# Patient Record
Sex: FEMALE | Race: WHITE | Employment: UNEMPLOYED | URBAN - METROPOLITAN AREA
[De-identification: names, ages, dates, MRNs, and addresses within clinical notes are randomized per-mention and may not be internally consistent; named-entity substitution may affect disease eponyms.]

---

## 2017-02-16 ENCOUNTER — DOCUMENTATION ONLY (OUTPATIENT)
Dept: FAMILY MEDICINE CLINIC | Age: 52
End: 2017-02-16

## 2017-02-16 NOTE — PROGRESS NOTES
Spoke with patient. She has moved and is getting her health care needs taken care of at a new location.

## 2019-12-16 NOTE — PROGRESS NOTES
Hematology/Oncology Consultation Note    Name: Ashleigh Gasca  Date: 2019  : 1965    Colby Evans MD         Subjective:     Chief complaint: Iron deficiency anemia    History of Present Illness:   Ms. Bong Fam is a most pleasant 48y.o. year old female who was seen for Iron deficiency anemia. The patient has history of iron deficiency anemia for years, GI bleed, colitis, peptic ulcer disease with H.pylori, B12 deficiency, asthma, sciatica, migraine, and fibromyalgia. She has a long history of anemia with multiple blood transfusion since childhood. She has history of recurrent H.pylori, peptic ulcer disease, and episodes of colitis flare with GI bleed and dark stools. GI evaluation 18 reported no evidence of Ulcerative colitis, ischemic or infectious colitis. She did not take oral iron as was told she could not absorb iron orally. She admitted she usually received iron IV together with blood transfusion. She was admitted on 19 for symptomatic anemia. CBC checked on 19 showed H/H 7.8/26. 6. Iron study showed Iron 38, TIBC 349, Iron saturation 11, and Ferritin 4. Reticulocyte Hemoglobin was low at 18.9. Vitamin B12 was low 183. She received 2 units of PRBCs and Iron IV during admission. Labs checked with her PCP on 19 reported improving Hb, 8.2. Today she reports feeling much better without any obvious complaints. She denied any further bleeding, melena, blood in stool, nausea, vomiting, diarrhea, or abdominal pain. She did receive monthly B12 injection in the past but stopped taking for about 2 years. She will schedule her b12 injection again with her PCP. She denied any fever, chills, fatigue, dizziness, headache, dyspnea, worsen cough, chest pain, new tingling/numbness or focal weakness. Her eating is well with a good appetite. She is currently working with [facility] [place] for a Gastroenterology follow-up. Presently she is taking prilosec twice daily.  She denied any other bleeding, also stopped periods for years, no abnormal menstrual or vaginal bleeding. No dark urine or hematuria or hemoptysis. FH: Denied family history of anemia or blood disorders      Oncologic History:   No history exists. Past Medical History, Family History, and Social History:    Past Medical History:   Diagnosis Date    Anemia     Asthma     Celiac disease     Depression     Fibromyalgia     Gastric ulcer     History of blood transfusion     History of stomach ulcers     Hypertension     Joint pain     Musculoskeletal disorder     back problems     Past Surgical History:   Procedure Laterality Date    HX ACL RECONSTRUCTION      HX APPENDECTOMY      HX  SECTION  x4    HX CHOLECYSTECTOMY      HX GI  2012    Parts of small and Large intestine taken due to ulcers.     HX OOPHORECTOMY      HX ORTHOPAEDIC      Lt knee    HX TYMPANOSTOMY       Social History     Socioeconomic History    Marital status:      Spouse name: Not on file    Number of children: Not on file    Years of education: Not on file    Highest education level: Not on file   Occupational History    Not on file   Social Needs    Financial resource strain: Not on file    Food insecurity:     Worry: Not on file     Inability: Not on file    Transportation needs:     Medical: Not on file     Non-medical: Not on file   Tobacco Use    Smoking status: Former Smoker     Last attempt to quit: 5/3/2012     Years since quittin.6    Smokeless tobacco: Never Used   Substance and Sexual Activity    Alcohol use: No    Drug use: No    Sexual activity: Yes     Partners: Male   Lifestyle    Physical activity:     Days per week: Not on file     Minutes per session: Not on file    Stress: Not on file   Relationships    Social connections:     Talks on phone: Not on file     Gets together: Not on file     Attends Yazdanism service: Not on file     Active member of club or organization: Not on file     Attends meetings of clubs or organizations: Not on file     Relationship status: Not on file    Intimate partner violence:     Fear of current or ex partner: Not on file     Emotionally abused: Not on file     Physically abused: Not on file     Forced sexual activity: Not on file   Other Topics Concern    Not on file   Social History Narrative    Not on file     Family History   Problem Relation Age of Onset    Diabetes Mother     Hypertension Mother     Diabetes Father     Hypertension Father     Hypertension Sister     Diabetes Sister      Current Outpatient Medications   Medication Sig Dispense Refill    OTHER Take 0.5 mL by mouth.  omeprazole (PRILOSEC) 20 mg capsule Take 20 mg by mouth.  HYDROcodone-acetaminophen (NORCO) 5-325 mg per tablet Take 1 Tab by mouth every four (4) hours as needed for Pain. Max Daily Amount: 6 Tabs. 12 Tab 0    mirtazapine (REMERON SOL-TAB) 15 mg disintegrating tablet Take 1 Tab by mouth nightly. 30 Tab 5    ferrous sulfate 75mg/ml, 15 mg/ml elemental iron, (EVGENY-IN-SOL) 15 mg iron (75 mg)/mL drop drops Take 15 mg by mouth BID Mon Wed & Fri.          Review of Systems:  Constitutional:   Fever: Negative, Chills: Negative, Weight Loss: Negative, Malaise/Fatigue: Negative   Diaphoresis: Negative  Skin:   Rash: Negative,  Itching: Negative  HENT:   Headache:Negative, Hearing Loss: Negative, Tinnitus: Negative, Ear Pain: Negative   Nosebleeds: Negative, Stridor: Negative, Sore Throat: Negative  Eyes:   Jaudice: negative Blurred Vision: Negative, Double Vision: Negative, Photophobia: Negative   Eye Discharge: Negative, Eye Redness: Negative  Cardiovascular:    Chest Pain: Negative, Palpitations: Negative, Orthopnea: Negative   Leg Swelling: Negative PND: Negative  Respiratory:   Cough: Negative, Hemoptysis: Negative, Sputum Production: Negative   Shortness of Breath: Negative, Wheezing: Negative  Gastrointestinal:   Heartburn: Negative, Nausea: Negative, Vomiting: Negative Abdominal Pain: Negative, Diarrhea: Negative, Constipation: Negative   Blood in Stool: Negative, Melena: Negative   Genitourinary:    Dysuria: Negative, Urgency: Negative, Frequency: Negative   Hematuria: Negative, Flank Pain: Negative  Musculoskeletal:    Myalgias: Negative, Neck Pain: Negative, Back Pain: Negative   Joint Pain: Negative, Falls: Negative  Endo/Heme:    Easy Bruise/Blood: Negative, Polydipsia: Negative, sweats: negative  Neurological:    Dizziness: Negative, Tingling: Negative. Tremor: Negative,    Sensory Change: Negative. Speech Change: Negative, Focal Weakness: Negative     Seizures: Negative, LOC: Negative  Psychiatric:   Depression: Negative, Suicidal Ideas: Negative,  Hallucinations: Negative, Anxious: Negative, Insomnia: Negative, Memory Loss: Negative       Objective:     Visit Vitals  /81 (BP 1 Location: Right arm, BP Patient Position: Sitting)   Pulse 61   Temp 98.1 °F (36.7 °C) (Oral)   Resp 18   Ht 5' 3\" (1.6 m)   Wt 66 kg (145 lb 9.6 oz)   LMP 12/26/2012   SpO2 100%   BMI 25.79 kg/m²       ECOG Performance Status (grade): 0  0 - able to carry on all pre-disease activity w/out restriction  1 - restricted but able to carry out light work  2 - ambulatory and can self- care but unable to carry out work  3 - bed or chair >50% of waking hours  4 - completely disable, total care, confined to bed or chair    Physical Exam:   Gen. Appearance: The patient is in no acute distress. Skin: There is no bruise or rash. HEENT: The exam is unremarkable. Neck: Supple without lymphadenopathy or thyromegaly. Lungs: Clear to auscultation and percussion; there are no wheezes or rhonchi. Heart: Regular rate and rhythm; there are no murmurs, gallops, or rubs. Abdomen: Bowel sounds are present and normal.  There is no guarding, tenderness, or hepatosplenomegaly. Extremities: There is no clubbing, cyanosis, or edema. Neurologic: There are no focal neurologic deficits.     Lymphatics: There is no palpable peripheral lymphadenopathy. Musculoskeletal: The patient has full range of motion at all joints. There is no evidence of joint deformity or effusions. There is no focal joint tenderness. Psychological/psychiatric: There is no clinical evidence of anxiety, depression, or melancholy. Diagnostics:      No results found for this or any previous visit. No results found for this or any previous visit (from the past 2016 hour(s)). Assessment and Plan:                                        1. Iron deficiency anemia due to chronic blood loss    2. Anemia due to vitamin B12 deficiency, unspecified B12 deficiency type    Today I discussed with her about the diagnosis of anemia. She has long history of iron deficiency anemia and Vitamin B12 deficiency. She did receive multiple blood transfusion, IV Iron, and B12 injection in the past. Her iron deficiency anemia could be related to gastrointestinal losses with colitis and peptic ulcer. Helicobacter pylori infection, even in the absence of active gastrointestinal bleeding, can cause iron deficiency and is known to reduce both iron and ferritin levels. She was admitted on 11/25/19 for symptomatic anemia. CBC checked on 11/26/19 showed H/H 7.8/26. 6. Iron study showed Iron 38, TIBC 349, Iron saturation 11, and Ferritin 4. Reticulocyte Hemoglobin was low at 18.9. Vitamin B12 was also low 183. She received 2 units of PRBCs and Iron IV during the admission. Labs followup with her PCP on 12/4/19 reported improving Hb, 8.2. Clinically improving with no new bleeding episodes. Plan:  - We will repeat CBC and Iron study with Ferritin 6-8 weeks after Iron infused to ensure an appropriate response (about 3 weeks from now). - We will also order other work-up for chronic anemia including CRP/ESR, SPEP, LDH, Haptoglobin, ans TSH.  - We have recommended Gastroenterological workup for chronic blood loss from colitis and peptic ulcer with H.pylori.  She is presently pending GI schedule at [facility].  - She will arrange her B12 injection with her PCP office.  - I will see the patient back in clinic in about 4 weeks. Always sooner if required. Orders Placed This Encounter    LD     Standing Status:   Future     Standing Expiration Date:   12/16/2020    C REACTIVE PROTEIN, QT     Standing Status:   Future     Standing Expiration Date:   12/16/2020    TSH AND FREE T4     Standing Status:   Future     Standing Expiration Date:   12/16/2020    SED RATE (ESR)     Standing Status:   Future     Standing Expiration Date:   12/16/2020    PROTEIN ELECTROPHORESIS     Standing Status:   Future     Standing Expiration Date:   12/16/2020    VITAMIN B12 & FOLATE     Standing Status:   Future     Standing Expiration Date:   12/16/2020    FERRITIN     Standing Status:   Future     Standing Expiration Date:   12/16/2020    IRON PROFILE     Standing Status:   Future     Standing Expiration Date:   34/28/2921    METABOLIC PANEL, COMPREHENSIVE     Standing Status:   Future     Standing Expiration Date:   12/16/2020    RETICULOCYTE COUNT     Standing Status:   Future     Standing Expiration Date:   12/16/2020    CBC WITH AUTOMATED DIFF     Standing Status:   Future     Standing Expiration Date:   12/16/2020    HAPTOGLOBIN     Standing Status:   Future     Standing Expiration Date:   12/17/2020    OTHER     Sig: Take 0.5 mL by mouth.  omeprazole (PRILOSEC) 20 mg capsule     Sig: Take 20 mg by mouth. All of patient's questions answered to their apparent satisfaction. They verbally show understanding and agreement with the plan. Ms. Laura Guajardo has a reminder for a \"due or due soon\" health maintenance. I have asked that she contact her primary care provider for follow-up on this health maintenance.      About 45 minutes were spent for this encounter with more than 50% of the time spent in face-to-face counseling and discussing on diagnosis and management plan going forward. I would like to  thank Dr Geovanna San MD  for allowing me to participate in the care of this very pleasant patient. If I can be of further assistance please do not hesitate to call. Hiram Hawk MD  12/17/2019          Parts of this document has been produced using Dragon dictation system. Unrecognized errors in transcription may be present. Please do not hesitate to reach out for any questions or clarifications.

## 2019-12-17 ENCOUNTER — OFFICE VISIT (OUTPATIENT)
Dept: ONCOLOGY | Age: 54
End: 2019-12-17

## 2019-12-17 VITALS
TEMPERATURE: 98.1 F | HEIGHT: 63 IN | WEIGHT: 145.6 LBS | HEART RATE: 61 BPM | OXYGEN SATURATION: 100 % | BODY MASS INDEX: 25.8 KG/M2 | SYSTOLIC BLOOD PRESSURE: 119 MMHG | RESPIRATION RATE: 18 BRPM | DIASTOLIC BLOOD PRESSURE: 81 MMHG

## 2019-12-17 DIAGNOSIS — D51.9 ANEMIA DUE TO VITAMIN B12 DEFICIENCY, UNSPECIFIED B12 DEFICIENCY TYPE: ICD-10-CM

## 2019-12-17 DIAGNOSIS — D50.0 IRON DEFICIENCY ANEMIA DUE TO CHRONIC BLOOD LOSS: Primary | ICD-10-CM

## 2019-12-17 PROBLEM — D50.9 IRON DEFICIENCY ANEMIA: Status: ACTIVE | Noted: 2019-12-17

## 2019-12-17 PROBLEM — D51.3 OTHER DIETARY VITAMIN B12 DEFICIENCY ANEMIA: Status: ACTIVE | Noted: 2019-12-17

## 2019-12-17 RX ORDER — OMEPRAZOLE 20 MG/1
20 CAPSULE, DELAYED RELEASE ORAL
COMMUNITY

## 2019-12-17 NOTE — LETTER
12/17/19 Patient: Naun Pappas YOB: 1965 Date of Visit: 12/17/2019 Chauncy Libman, MD 
[address] [place] [id number] VIA Facsimile: [phone] Dear Chauncy Libman, MD, Thank you for referring Ms. Naun Pappas to [facility] for evaluation. My notes for this consultation are attached. If you have questions, please do not hesitate to call me. I look forward to following your patient along with you. Sincerely, Emily Stevens MD

## 2019-12-17 NOTE — PATIENT INSTRUCTIONS
Iron Deficiency Anemia: Care Instructions Your Care Instructions Anemia means that you do not have enough red blood cells. Red blood cells carry oxygen around your body. When you have anemia, it can make you pale, weak, and tired. Many things can cause anemia. The most common cause is loss of blood. This can happen if you have heavy menstrual periods. It can also happen if you have bleeding in your stomach or bowel. You can also get anemia if you don't have enough iron in your diet or if it's hard for your body to absorb iron. In some cases, pregnancy causes anemia. That's because a pregnant woman needs more iron. Your doctor may do more tests to find the cause of your anemia. If a disease or other health problem is causing it, your doctor will treat that problem. It's important to follow up with your doctor to make sure that your iron level returns to normal. 
Follow-up care is a key part of your treatment and safety. Be sure to make and go to all appointments, and call your doctor if you are having problems. It's also a good idea to know your test results and keep a list of the medicines you take. How can you care for yourself at home? · If your doctor recommended iron pills, take them as directed. ? Try to take the pills on an empty stomach. You can do this about 1 hour before or 2 hours after meals. But you may need to take iron with food to avoid an upset stomach. ? Do not take antacids or drink milk or anything with caffeine within 2 hours of when you take your iron. They can keep your body from absorbing the iron well. ? Vitamin C helps your body absorb iron. You may want to take iron pills with a glass of orange juice or some other food high in vitamin C. 
? Iron pills may cause stomach problems. These include heartburn, nausea, diarrhea, constipation, and cramps. It can help to drink plenty of fluids and include fruits, vegetables, and fiber in your diet. ? It's normal for iron pills to make your stool a greenish or grayish black. But internal bleeding can also cause dark stool. So it's important to tell your doctor about any color changes. ? Call your doctor if you think you are having a problem with your iron pills. Even after you start to feel better, it will take several months for your body to build up its supply of iron. ? If you miss a pill, don't take a double dose. ? Keep iron pills out of the reach of small children. Too much iron can be very dangerous. · Eat foods with a lot of iron. These include red meat, shellfish, poultry, and eggs. They also include beans, raisins, whole-grain bread, and leafy green vegetables. · Steam your vegetables. This is the best way to prepare them if you want to get as much iron as possible. · Be safe with medicines. Do not take nonsteroidal anti-inflammatory pain relievers unless your doctor tells you to. These include aspirin, naproxen (Aleve), and ibuprofen (Advil, Motrin). · Liquid iron can stain your teeth. But you can mix it with water or juice and drink it with a straw. Then it won't get on your teeth. When should you call for help? Call 911 anytime you think you may need emergency care. For example, call if: 
  · You passed out (lost consciousness).  
 Call your doctor now or seek immediate medical care if: 
  · You are short of breath.  
  · You are dizzy or light-headed, or you feel like you may faint.  
  · You have new or worse bleeding.  
 Watch closely for changes in your health, and be sure to contact your doctor if: 
  · You feel weaker or more tired than usual.  
  · You do not get better as expected. Where can you learn more? Go to http://bianca-deven.info/. Enter Q005 in the search box to learn more about \"Iron Deficiency Anemia: Care Instructions. \" Current as of: March 28, 2019 Content Version: 12.2 © 2874-8014 [employer].  Care instructions adapted under license by [address] (which disclaims liability or warranty for this information). If you have questions about a medical condition or this instruction, always ask your healthcare professional. [address] any warranty or liability for your use of this information.

## 2020-01-07 ENCOUNTER — HOSPITAL ENCOUNTER (OUTPATIENT)
Dept: INFUSION THERAPY | Age: 55
Discharge: HOME | End: 2020-01-07
Payer: SUBSIDIZED

## 2020-01-07 LAB
ALBUMIN SERPL-MCNC: 3.8 G/DL (ref 3.4–5)
ALBUMIN/GLOB SERPL: 1.4 {RATIO} (ref 0.8–1.7)
ALP SERPL-CCNC: 97 U/L (ref 45–117)
ALT SERPL-CCNC: 23 U/L (ref 13–56)
ANION GAP SERPL CALC-SCNC: 5 MMOL/L (ref 3–18)
AST SERPL-CCNC: 18 U/L (ref 10–38)
BASOPHILS # BLD: 0 K/UL (ref 0–0.1)
BASOPHILS NFR BLD: 1 % (ref 0–2)
BILIRUB SERPL-MCNC: 0.2 MG/DL (ref 0.2–1)
BUN SERPL-MCNC: 17 MG/DL (ref 7–18)
BUN/CREAT SERPL: 21 (ref 12–20)
CALCIUM SERPL-MCNC: 9.2 MG/DL (ref 8.5–10.1)
CHLORIDE SERPL-SCNC: 107 MMOL/L (ref 100–111)
CO2 SERPL-SCNC: 28 MMOL/L (ref 21–32)
CREAT SERPL-MCNC: 0.8 MG/DL (ref 0.6–1.3)
CRP SERPL-MCNC: <0.3 MG/DL (ref 0–0.3)
DIFFERENTIAL METHOD BLD: ABNORMAL
EOSINOPHIL # BLD: 0.1 K/UL (ref 0–0.4)
EOSINOPHIL NFR BLD: 2 % (ref 0–5)
ERYTHROCYTE [DISTWIDTH] IN BLOOD BY AUTOMATED COUNT: 20.8 % (ref 11.6–14.5)
ERYTHROCYTE [SEDIMENTATION RATE] IN BLOOD: 13 MM/HR (ref 0–30)
FERRITIN SERPL-MCNC: 5 NG/ML (ref 8–388)
FOLATE SERPL-MCNC: 9.4 NG/ML (ref 3.1–17.5)
GLOBULIN SER CALC-MCNC: 2.7 G/DL (ref 2–4)
GLUCOSE SERPL-MCNC: 83 MG/DL (ref 74–99)
HAPTOGLOB SERPL-MCNC: 139 MG/DL (ref 30–200)
HCT VFR BLD AUTO: 33.7 % (ref 35–45)
HGB BLD-MCNC: 10.2 G/DL (ref 12–16)
IRON SATN MFR SERPL: 9 %
IRON SERPL-MCNC: 36 UG/DL (ref 50–175)
LDH SERPL L TO P-CCNC: 156 U/L (ref 81–234)
LYMPHOCYTES # BLD: 0.8 K/UL (ref 0.9–3.6)
LYMPHOCYTES NFR BLD: 28 % (ref 21–52)
MCH RBC QN AUTO: 26.2 PG (ref 24–34)
MCHC RBC AUTO-ENTMCNC: 30.3 G/DL (ref 31–37)
MCV RBC AUTO: 86.6 FL (ref 74–97)
MONOCYTES # BLD: 0.3 K/UL (ref 0.05–1.2)
MONOCYTES NFR BLD: 10 % (ref 3–10)
NEUTS SEG # BLD: 1.7 K/UL (ref 1.8–8)
NEUTS SEG NFR BLD: 59 % (ref 40–73)
PLATELET # BLD AUTO: 251 K/UL (ref 135–420)
PMV BLD AUTO: 10.6 FL (ref 9.2–11.8)
POTASSIUM SERPL-SCNC: 4.7 MMOL/L (ref 3.5–5.5)
PROT SERPL-MCNC: 6.5 G/DL (ref 6.4–8.2)
RBC # BLD AUTO: 3.89 M/UL (ref 4.2–5.3)
RETICS/RBC NFR AUTO: 0.9 % (ref 0.5–2.3)
SODIUM SERPL-SCNC: 140 MMOL/L (ref 136–145)
T4 FREE SERPL-MCNC: 0.8 NG/DL (ref 0.7–1.5)
TIBC SERPL-MCNC: 409 UG/DL (ref 250–450)
TSH SERPL DL<=0.05 MIU/L-ACNC: 1.17 UIU/ML (ref 0.36–3.74)
VIT B12 SERPL-MCNC: 269 PG/ML (ref 211–911)
WBC # BLD AUTO: 2.8 K/UL (ref 4.6–13.2)

## 2020-01-07 PROCEDURE — 36415 COLL VENOUS BLD VENIPUNCTURE: CPT

## 2020-01-07 PROCEDURE — 83010 ASSAY OF HAPTOGLOBIN QUANT: CPT

## 2020-01-07 PROCEDURE — 85652 RBC SED RATE AUTOMATED: CPT

## 2020-01-07 PROCEDURE — 86140 C-REACTIVE PROTEIN: CPT

## 2020-01-07 PROCEDURE — 82607 VITAMIN B-12: CPT

## 2020-01-07 PROCEDURE — 83615 LACTATE (LD) (LDH) ENZYME: CPT

## 2020-01-07 PROCEDURE — 84165 PROTEIN E-PHORESIS SERUM: CPT

## 2020-01-07 PROCEDURE — 82728 ASSAY OF FERRITIN: CPT

## 2020-01-07 PROCEDURE — 85025 COMPLETE CBC W/AUTO DIFF WBC: CPT

## 2020-01-07 PROCEDURE — 83540 ASSAY OF IRON: CPT

## 2020-01-07 PROCEDURE — 80053 COMPREHEN METABOLIC PANEL: CPT

## 2020-01-07 PROCEDURE — 84439 ASSAY OF FREE THYROXINE: CPT

## 2020-01-07 PROCEDURE — 85045 AUTOMATED RETICULOCYTE COUNT: CPT

## 2020-01-08 LAB
ALBUMIN SERPL ELPH-MCNC: 3.8 G/DL (ref 2.9–4.4)
ALBUMIN/GLOB SERPL: 1.7 {RATIO} (ref 0.7–1.7)
ALPHA1 GLOB SERPL ELPH-MCNC: 0.2 G/DL (ref 0–0.4)
ALPHA2 GLOB SERPL ELPH-MCNC: 0.6 G/DL (ref 0.4–1)
B-GLOBULIN SERPL ELPH-MCNC: 1 G/DL (ref 0.7–1.3)
GAMMA GLOB SERPL ELPH-MCNC: 0.5 G/DL (ref 0.4–1.8)
GLOBULIN SER CALC-MCNC: 2.3 G/DL (ref 2.2–3.9)
M PROTEIN SERPL ELPH-MCNC: NORMAL G/DL
PROT SERPL-MCNC: 6.1 G/DL (ref 6–8.5)

## 2020-01-13 NOTE — PROGRESS NOTES
Hematology/Oncology Note    Name: Caryl Barboza  Date: 2020  : 1965    Annie Palomino MD         Subjective:     Chief complaint: Follow up anemia    History of Present Illness: The patient presented here today for follow up her anemia. She reported having celiac flares for the past few days with diarrhea, also noted some blood in stools. She has had LLQ mild crampy pain for few days, no obvious radiation or alleviating factors. Denied nausea, vomiting, significant rectal bleeding, bruising, other bleeding issues. She denied any fever, chills, fatigue, dizziness, headache, dyspnea, worsen cough, chest pain, new tingling/numbness or focal weakness. Her eating is fairly well with gluten free diets. She already scheduled to see GI next month, 2020. Also she will start B12 injection next week at PCP office. She was not feeling well with iron pills in the past. Presently she is taking prilosec twice daily. History:  Ms. Loren Lopez is a most pleasant 47y.o. year old female who was seen for Iron deficiency anemia. The patient has history of iron deficiency anemia for years, GI bleed, Celiac disease, colitis, peptic ulcer disease with H.pylori, B12 deficiency, asthma, sciatica, migraine, and fibromyalgia. She has a long history of anemia with multiple blood transfusion since childhood. She has history of recurrent H.pylori, peptic ulcer disease, and episodes of colitis flare with GI bleed and dark stools. GI evaluation 18 reported no evidence of Ulcerative colitis, ischemic or infectious colitis. She has been doing well with following a gluten free diet. She did not take oral iron as was told she could not absorb iron orally. She admitted she usually received iron IV together with blood transfusion. She was admitted on 19 for symptomatic anemia. CBC checked on 19 showed H/H 7.8/26. 6. Iron study showed Iron 38, TIBC 349, Iron saturation 11, and Ferritin 4.  Reticulocyte Hemoglobin was low at 18.9. Vitamin B12 was low 183. She received 2 units of PRBCs and Iron IV during admission. Labs checked with her PCP on 19 reported improving Hb, 8.2. FH: Denied family history of anemia or blood disorders      Oncologic History:   No history exists. Past Medical History, Family History, and Social History:    Past Medical History:   Diagnosis Date    Anemia     Asthma     Celiac disease     Depression     Fibromyalgia     Gastric ulcer     History of blood transfusion     History of stomach ulcers     Hypertension     Joint pain     Musculoskeletal disorder     back problems     Past Surgical History:   Procedure Laterality Date    HX ACL RECONSTRUCTION      HX APPENDECTOMY      HX  SECTION  x4    HX CHOLECYSTECTOMY      HX GI  2012    Parts of small and Large intestine taken due to ulcers.     HX OOPHORECTOMY      HX ORTHOPAEDIC      Lt knee    HX TYMPANOSTOMY       Social History     Socioeconomic History    Marital status:      Spouse name: Not on file    Number of children: Not on file    Years of education: Not on file    Highest education level: Not on file   Occupational History    Not on file   Social Needs    Financial resource strain: Not on file    Food insecurity:     Worry: Not on file     Inability: Not on file    Transportation needs:     Medical: Not on file     Non-medical: Not on file   Tobacco Use    Smoking status: Former Smoker     Last attempt to quit: 5/3/2012     Years since quittin.7    Smokeless tobacco: Never Used   Substance and Sexual Activity    Alcohol use: No    Drug use: No    Sexual activity: Yes     Partners: Male   Lifestyle    Physical activity:     Days per week: Not on file     Minutes per session: Not on file    Stress: Not on file   Relationships    Social connections:     Talks on phone: Not on file     Gets together: Not on file     Attends [employer] service: Not on file     Active member of club or organization: Not on file     Attends meetings of clubs or organizations: Not on file     Relationship status: Not on file    Intimate partner violence:     Fear of current or ex partner: Not on file     Emotionally abused: Not on file     Physically abused: Not on file     Forced sexual activity: Not on file   Other Topics Concern    Not on file   Social History Narrative    Not on file     Family History   Problem Relation Age of Onset    Diabetes Mother     Hypertension Mother     Diabetes Father     Hypertension Father     Hypertension Sister     Diabetes Sister      Current Outpatient Medications   Medication Sig Dispense Refill    omeprazole (PRILOSEC) 20 mg capsule Take 20 mg by mouth.  HYDROcodone-acetaminophen (NORCO) 5-325 mg per tablet Take 1 Tab by mouth every four (4) hours as needed for Pain. Max Daily Amount: 6 Tabs. 12 Tab 0    mirtazapine (REMERON SOL-TAB) 15 mg disintegrating tablet Take 1 Tab by mouth nightly. 30 Tab 5    ferrous sulfate 75mg/ml, 15 mg/ml elemental iron, (EVGENY-IN-SOL) 15 mg iron (75 mg)/mL drop drops Take 15 mg by mouth BID Mon Wed & Fri.      OTHER Take 0.5 mL by mouth.          Review of Systems:  Constitutional:   Fever: Negative, Chills: Negative, Weight Loss: Negative, Chronic Fatigue: Positive   Diaphoresis: Negative  Skin:   Rash: Negative,  Itching: Negative  HENT:   Headache:Negative, Hearing Loss: Negative, Tinnitus: Negative, Ear Pain: Negative   Nosebleeds: Negative, Stridor: Negative, Sore Throat: Negative  Eyes:   Jaudice: negative Blurred Vision: Negative, Double Vision: Negative, Photophobia: Negative   Eye Discharge: Negative, Eye Redness: Negative  Cardiovascular:    Chest Pain: Negative, Palpitations: Negative, Orthopnea: Negative   Leg Swelling: Negative PND: Negative  Respiratory:   Cough: Negative, Hemoptysis: Negative, Sputum Production: Negative   Shortness of Breath: Negative, Wheezing: Negative  Gastrointestinal: Heartburn: Negative, Nausea: Negative, Vomiting: Negative   Abdominal Pain: Negative, Diarrhea: positive, Constipation: Negative   Blood in Stool: Positive, Melena: Negative   Genitourinary:    Dysuria: Negative, Urgency: Negative, Frequency: Negative   Hematuria: Negative, Flank Pain: Negative  Musculoskeletal:    Myalgias: Negative, Neck Pain: Negative, Back Pain: Negative   Joint Pain: Negative, Falls: Negative  Endo/Heme:    Easy Bruise/Blood: Negative, Polydipsia: Negative, sweats: negative  Neurological:    Dizziness: Negative, Tingling: Negative. Tremor: Negative,    Sensory Change: Negative. Speech Change: Negative, Focal Weakness: Negative     Seizures: Negative, LOC: Negative  Psychiatric:   Depression: Negative, Suicidal Ideas: Negative,  Hallucinations: Negative, Anxious: Negative, Insomnia: Negative, Memory Loss: Negative       Objective:     Visit Vitals  /87 (BP 1 Location: Right arm, BP Patient Position: Sitting)   Pulse 66   Temp 98.4 °F (36.9 °C) (Oral)   Resp 18   Ht 5' 3\" (1.6 m)   Wt 67.1 kg (148 lb)   LMP 12/26/2012   SpO2 100%   BMI 26.22 kg/m²       ECOG Performance Status (grade): 0  0 - able to carry on all pre-disease activity w/out restriction  1 - restricted but able to carry out light work  2 - ambulatory and can self- care but unable to carry out work  3 - bed or chair >50% of waking hours  4 - completely disable, total care, confined to bed or chair    Physical Exam:   Gen. Appearance: The patient is in no acute distress. Skin: There is no bruise or rash. HEENT: The exam is unremarkable. Neck: Supple without lymphadenopathy or thyromegaly. Lungs: Clear to auscultation and percussion; there are no wheezes or rhonchi. Heart: Regular rate and rhythm; there are no murmurs, gallops, or rubs. Abdomen: Bowel sounds are present and normal.  There is no guarding, tenderness, or hepatosplenomegaly. Extremities: There is no clubbing, cyanosis, or edema.     Neurologic: There are no focal neurologic deficits. Lymphatics: There is no palpable peripheral lymphadenopathy. Musculoskeletal: The patient has full range of motion at all joints. There is no evidence of joint deformity or effusions. There is no focal joint tenderness. Psychological/psychiatric: There is no clinical evidence of anxiety, depression, or melancholy. Diagnostics:      No results found for this or any previous visit. Recent Results (from the past 2016 hour(s))   VITAMIN B12 & FOLATE    Collection Time: 01/07/20 10:25 AM   Result Value Ref Range    Vitamin B12 269 211 - 911 pg/mL    Folate 9.4 3.10 - 17.50 ng/mL   CBC WITH AUTOMATED DIFF    Collection Time: 01/07/20 10:25 AM   Result Value Ref Range    WBC 2.8 (L) 4.6 - 13.2 K/uL    RBC 3.89 (L) 4.20 - 5.30 M/uL    HGB 10.2 (L) 12.0 - 16.0 g/dL    HCT 33.7 (L) 35.0 - 45.0 %    MCV 86.6 74.0 - 97.0 FL    MCH 26.2 24.0 - 34.0 PG    MCHC 30.3 (L) 31.0 - 37.0 g/dL    RDW 20.8 (H) 11.6 - 14.5 %    PLATELET 592 093 - 835 K/uL    MPV 10.6 9.2 - 11.8 FL    NEUTROPHILS 59 40 - 73 %    LYMPHOCYTES 28 21 - 52 %    MONOCYTES 10 3 - 10 %    EOSINOPHILS 2 0 - 5 %    BASOPHILS 1 0 - 2 %    ABS. NEUTROPHILS 1.7 (L) 1.8 - 8.0 K/UL    ABS. LYMPHOCYTES 0.8 (L) 0.9 - 3.6 K/UL    ABS. MONOCYTES 0.3 0.05 - 1.2 K/UL    ABS. EOSINOPHILS 0.1 0.0 - 0.4 K/UL    ABS.  BASOPHILS 0.0 0.0 - 0.1 K/UL    DF AUTOMATED     C REACTIVE PROTEIN, QT    Collection Time: 01/07/20 10:25 AM   Result Value Ref Range    C-Reactive protein <0.3 0 - 0.3 mg/dL   SED RATE (ESR)    Collection Time: 01/07/20 10:25 AM   Result Value Ref Range    Sed rate, automated 13 0 - 30 mm/hr   FERRITIN    Collection Time: 01/07/20 10:25 AM   Result Value Ref Range    Ferritin 5 (L) 8 - 388 NG/ML   HAPTOGLOBIN    Collection Time: 01/07/20 10:25 AM   Result Value Ref Range    Haptoglobin 139 30 - 200 mg/dL   IRON PROFILE    Collection Time: 01/07/20 10:25 AM   Result Value Ref Range    Iron 36 (L) 50 - 175 ug/dL TIBC 409 250 - 450 ug/dL    Iron % saturation 9 %   LD    Collection Time: 01/07/20 10:25 AM   Result Value Ref Range     81 - 625 U/L   METABOLIC PANEL, COMPREHENSIVE    Collection Time: 01/07/20 10:25 AM   Result Value Ref Range    Sodium 140 136 - 145 mmol/L    Potassium 4.7 3.5 - 5.5 mmol/L    Chloride 107 100 - 111 mmol/L    CO2 28 21 - 32 mmol/L    Anion gap 5 3.0 - 18 mmol/L    Glucose 83 74 - 99 mg/dL    BUN 17 7.0 - 18 MG/DL    Creatinine 0.80 0.6 - 1.3 MG/DL    BUN/Creatinine ratio 21 (H) 12 - 20      GFR est AA >60 >60 ml/min/1.73m2    GFR est non-AA >60 >60 ml/min/1.73m2    Calcium 9.2 8.5 - 10.1 MG/DL    Bilirubin, total 0.2 0.2 - 1.0 MG/DL    ALT (SGPT) 23 13 - 56 U/L    AST (SGOT) 18 10 - 38 U/L    Alk. phosphatase 97 45 - 117 U/L    Protein, total 6.5 6.4 - 8.2 g/dL    Albumin 3.8 3.4 - 5.0 g/dL    Globulin 2.7 2.0 - 4.0 g/dL    A-G Ratio 1.4 0.8 - 1.7     RETICULOCYTE COUNT    Collection Time: 01/07/20 10:25 AM   Result Value Ref Range    Reticulocyte count 0.9 0.5 - 2.3 %   PROTEIN ELECTROPHORESIS    Collection Time: 01/07/20 10:25 AM   Result Value Ref Range    Protein, total 6.1 6.0 - 8.5 g/dL    Albumin 3.8 2.9 - 4.4 g/dL    Alpha-1-globulin 0.2 0.0 - 0.4 g/dL    ALPHA-2 GLOBULIN 0.6 0.4 - 1.0 g/dL    Beta globulin 1.0 0.7 - 1.3 g/dL    Gamma globulin 0.5 0.4 - 1.8 g/dL    M-Rad Not Observed Not Observed g/dL    Globulin, total 2.3 2.2 - 3.9 g/dL    A/G ratio 1.7 0.7 - 1.7     TSH AND FREE T4    Collection Time: 01/07/20 10:25 AM   Result Value Ref Range    TSH 1.17 0.36 - 3.74 uIU/mL    T4, Free 0.8 0.7 - 1.5 NG/DL         Assessment and Plan:                                        1. Iron deficiency anemia due to chronic blood loss    2. Anemia due to vitamin B12 deficiency, unspecified B12 deficiency type    3. Neutropenia, unspecified type ([facility] [address]       - She has long history of iron deficiency anemia and Vitamin B12 deficiency.  Received multiple blood transfusion, IV Iron, and B12 injection in the past. Her nutritional deficiency anemia may related to gastrointestinal losses and malabsorption given history of celiac disease, colitis, and peptic ulcer. Helicobacter pylori infection, even in the absence of active gastrointestinal bleeding, can cause iron deficiency and is known to reduce both iron and ferritin levels. - Admitted on 11/25/19 for symptomatic anemia. CBC showed H/H 7.8/26. 6. Iron study showed Iron 38, TIBC 349, Iron saturation 11, and Ferritin 4. Reticulocyte Hemoglobin was low at 18.9. Vitamin B12 was also low 183. She received 2 units of PRBCs and Iron IV during the admission. Labs followup with her PCP on 12/4/19 reported Hb, 8.2.  - Today I discussed with her about recent results. CBC showed improving H/H 10.2/33.7. Mild neutropenia ANC 1.7K, normal platelet counts. Iron study showed Iron Sat 9% and ferritin low at 5. B12 showed mild improving but still in low range, 269. Other workup was not remarkable included CRP/ESR, SPEP, LDH, Haptoglobin, and TSH.  - Given her recent flares of diarrhea with blood in stools, will check CBC today. If H./H show significant low we will arrange supportive blood transfusion.   - The patient has not been able to tolerate PO iron replacement. My recommendation is for IV iron therapy. We will set the patient up for therapy in the [facility] with injectafer 750mg IV weekly x2 doses. We discussed the potential risks of therapy, including allergic reaction, and the patient was agreeable for treatment. Plan to repeat CBC and ferritin about 2 months after iron is completed to ensure an appropriate response. - Her mild neutropenia could be related to nutritional deficiency, especially B12 def. Will follow up levels with B12 replacement. Plan:  - Will get CBC today  - She already scheduled with GI next month. - The patient will start B12 injection at PCP office next week.   - Advised the patient to seek a prompt medical care if worsen bleeding or symptomatic anemia or any concerns. - I will see the patient back in clinic in about 3 months. Always sooner if required. Orders Placed This Encounter    CBC WITH AUTOMATED DIFF     Standing Status:   Future     Standing Expiration Date:   1/14/2021         All of patient's questions answered to their apparent satisfaction. They verbally show understanding and agreement with the plan. Ms. Yasmine Pfeiffer has a reminder for a \"due or due soon\" health maintenance. I have asked that she contact her primary care provider for follow-up on this health maintenance. About 24 minutes were spent for this encounter with more than 50% of the time spent in face-to-face counseling and discussing on diagnosis and management plan going forward. Rosie Paz MD  1/14/2020          Parts of this document has been produced using Dragon dictation system. Unrecognized errors in transcription may be present. Please do not hesitate to reach out for any questions or clarifications.

## 2020-01-14 ENCOUNTER — HOSPITAL ENCOUNTER (OUTPATIENT)
Dept: INFUSION THERAPY | Age: 55
Discharge: HOME | End: 2020-01-14
Payer: SUBSIDIZED

## 2020-01-14 ENCOUNTER — OFFICE VISIT (OUTPATIENT)
Dept: ONCOLOGY | Age: 55
End: 2020-01-14

## 2020-01-14 VITALS
TEMPERATURE: 98.4 F | SYSTOLIC BLOOD PRESSURE: 124 MMHG | DIASTOLIC BLOOD PRESSURE: 87 MMHG | BODY MASS INDEX: 26.22 KG/M2 | RESPIRATION RATE: 18 BRPM | HEART RATE: 66 BPM | OXYGEN SATURATION: 100 % | WEIGHT: 148 LBS | HEIGHT: 63 IN

## 2020-01-14 DIAGNOSIS — D51.9 ANEMIA DUE TO VITAMIN B12 DEFICIENCY, UNSPECIFIED B12 DEFICIENCY TYPE: ICD-10-CM

## 2020-01-14 DIAGNOSIS — D50.0 IRON DEFICIENCY ANEMIA DUE TO CHRONIC BLOOD LOSS: Primary | ICD-10-CM

## 2020-01-14 DIAGNOSIS — D70.9 NEUTROPENIA, UNSPECIFIED TYPE (HCC): ICD-10-CM

## 2020-01-14 LAB
BASOPHILS # BLD: 0 K/UL (ref 0–0.1)
BASOPHILS NFR BLD: 0 % (ref 0–2)
DIFFERENTIAL METHOD BLD: ABNORMAL
EOSINOPHIL # BLD: 0.1 K/UL (ref 0–0.4)
EOSINOPHIL NFR BLD: 2 % (ref 0–5)
ERYTHROCYTE [DISTWIDTH] IN BLOOD BY AUTOMATED COUNT: 19.6 % (ref 11.6–14.5)
HCT VFR BLD AUTO: 33.7 % (ref 35–45)
HGB BLD-MCNC: 10.2 G/DL (ref 12–16)
LYMPHOCYTES # BLD: 1.2 K/UL (ref 0.9–3.6)
LYMPHOCYTES NFR BLD: 35 % (ref 21–52)
MCH RBC QN AUTO: 26.3 PG (ref 24–34)
MCHC RBC AUTO-ENTMCNC: 30.3 G/DL (ref 31–37)
MCV RBC AUTO: 86.9 FL (ref 74–97)
MONOCYTES # BLD: 0.4 K/UL (ref 0.05–1.2)
MONOCYTES NFR BLD: 12 % (ref 3–10)
NEUTS SEG # BLD: 1.7 K/UL (ref 1.8–8)
NEUTS SEG NFR BLD: 51 % (ref 40–73)
PLATELET # BLD AUTO: 253 K/UL (ref 135–420)
PMV BLD AUTO: 10.8 FL (ref 9.2–11.8)
RBC # BLD AUTO: 3.88 M/UL (ref 4.2–5.3)
WBC # BLD AUTO: 3.4 K/UL (ref 4.6–13.2)

## 2020-01-14 PROCEDURE — 36415 COLL VENOUS BLD VENIPUNCTURE: CPT

## 2020-01-14 PROCEDURE — 85025 COMPLETE CBC W/AUTO DIFF WBC: CPT

## 2020-01-14 RX ORDER — ALBUTEROL SULFATE 0.83 MG/ML
2.5 SOLUTION RESPIRATORY (INHALATION) AS NEEDED
Status: CANCELLED
Start: 2020-01-14

## 2020-01-14 RX ORDER — DIPHENHYDRAMINE HYDROCHLORIDE 50 MG/ML
50 INJECTION, SOLUTION INTRAMUSCULAR; INTRAVENOUS AS NEEDED
Status: CANCELLED
Start: 2020-01-14

## 2020-01-14 RX ORDER — HYDROCORTISONE SODIUM SUCCINATE 100 MG/2ML
100 INJECTION, POWDER, FOR SOLUTION INTRAMUSCULAR; INTRAVENOUS AS NEEDED
Status: CANCELLED | OUTPATIENT
Start: 2020-01-14

## 2020-01-14 RX ORDER — HEPARIN 100 UNIT/ML
300-500 SYRINGE INTRAVENOUS AS NEEDED
Status: CANCELLED
Start: 2020-01-14

## 2020-01-14 RX ORDER — EPINEPHRINE 1 MG/ML
0.3 INJECTION, SOLUTION, CONCENTRATE INTRAVENOUS AS NEEDED
Status: CANCELLED | OUTPATIENT
Start: 2020-01-14

## 2020-01-14 RX ORDER — ACETAMINOPHEN 325 MG/1
650 TABLET ORAL AS NEEDED
Status: CANCELLED
Start: 2020-01-14

## 2020-01-14 RX ORDER — SODIUM CHLORIDE 9 MG/ML
10 INJECTION INTRAMUSCULAR; INTRAVENOUS; SUBCUTANEOUS AS NEEDED
Status: CANCELLED | OUTPATIENT
Start: 2020-01-14

## 2020-01-14 RX ORDER — SODIUM CHLORIDE 0.9 % (FLUSH) 0.9 %
10 SYRINGE (ML) INJECTION AS NEEDED
Status: CANCELLED
Start: 2020-01-14

## 2020-01-14 RX ORDER — SODIUM CHLORIDE 9 MG/ML
25 INJECTION, SOLUTION INTRAVENOUS CONTINUOUS
Status: CANCELLED | OUTPATIENT
Start: 2020-01-14

## 2020-01-14 RX ORDER — ONDANSETRON 2 MG/ML
8 INJECTION INTRAMUSCULAR; INTRAVENOUS AS NEEDED
Status: CANCELLED | OUTPATIENT
Start: 2020-01-14

## 2020-01-14 NOTE — LETTER
1/14/20 Patient: Lou Pulido YOB: 1965 Date of Visit: 1/14/2020 Reinaldo Trevizo MD 
[address] [place] [id number] VIA Facsimile: [phone] Dear Reinaldo Trevizo MD, Thank you for referring Ms. Lou Pulido to [facility] for evaluation. My notes for this consultation are attached. If you have questions, please do not hesitate to call me. I look forward to following your patient along with you. Sincerely, Leny Miranda MD

## 2020-01-14 NOTE — PATIENT INSTRUCTIONS
Iron Deficiency Anemia: Care Instructions Your Care Instructions Anemia means that you do not have enough red blood cells. Red blood cells carry oxygen around your body. When you have anemia, it can make you pale, weak, and tired. Many things can cause anemia. The most common cause is loss of blood. This can happen if you have heavy menstrual periods. It can also happen if you have bleeding in your stomach or bowel. You can also get anemia if you don't have enough iron in your diet or if it's hard for your body to absorb iron. In some cases, pregnancy causes anemia. That's because a pregnant woman needs more iron. Your doctor may do more tests to find the cause of your anemia. If a disease or other health problem is causing it, your doctor will treat that problem. It's important to follow up with your doctor to make sure that your iron level returns to normal. 
Follow-up care is a key part of your treatment and safety. Be sure to make and go to all appointments, and call your doctor if you are having problems. It's also a good idea to know your test results and keep a list of the medicines you take. How can you care for yourself at home? · If your doctor recommended iron pills, take them as directed. ? Try to take the pills on an empty stomach. You can do this about 1 hour before or 2 hours after meals. But you may need to take iron with food to avoid an upset stomach. ? Do not take antacids or drink milk or anything with caffeine within 2 hours of when you take your iron. They can keep your body from absorbing the iron well. ? Vitamin C helps your body absorb iron. You may want to take iron pills with a glass of orange juice or some other food high in vitamin C. 
? Iron pills may cause stomach problems. These include heartburn, nausea, diarrhea, constipation, and cramps. It can help to drink plenty of fluids and include fruits, vegetables, and fiber in your diet. ? It's normal for iron pills to make your stool a greenish or grayish black. But internal bleeding can also cause dark stool. So it's important to tell your doctor about any color changes. ? Call your doctor if you think you are having a problem with your iron pills. Even after you start to feel better, it will take several months for your body to build up its supply of iron. ? If you miss a pill, don't take a double dose. ? Keep iron pills out of the reach of small children. Too much iron can be very dangerous. · Eat foods with a lot of iron. These include red meat, shellfish, poultry, and eggs. They also include beans, raisins, whole-grain bread, and leafy green vegetables. · Steam your vegetables. This is the best way to prepare them if you want to get as much iron as possible. · Be safe with medicines. Do not take nonsteroidal anti-inflammatory pain relievers unless your doctor tells you to. These include aspirin, naproxen (Aleve), and ibuprofen (Advil, Motrin). · Liquid iron can stain your teeth. But you can mix it with water or juice and drink it with a straw. Then it won't get on your teeth. When should you call for help? Call 911 anytime you think you may need emergency care. For example, call if: 
  · You passed out (lost consciousness).  
 Call your doctor now or seek immediate medical care if: 
  · You are short of breath.  
  · You are dizzy or light-headed, or you feel like you may faint.  
  · You have new or worse bleeding.  
 Watch closely for changes in your health, and be sure to contact your doctor if: 
  · You feel weaker or more tired than usual.  
  · You do not get better as expected. Where can you learn more? Go to http://bianca-deven.info/. Enter K524 in the search box to learn more about \"Iron Deficiency Anemia: Care Instructions. \" Current as of: March 28, 2019 Content Version: 12.2 © 6175-1493 [employer].  Care instructions adapted under license by [address] (which disclaims liability or warranty for this information). If you have questions about a medical condition or this instruction, always ask your healthcare professional. [address] any warranty or liability for your use of this information.

## 2020-01-22 ENCOUNTER — HOSPITAL ENCOUNTER (OUTPATIENT)
Dept: INFUSION THERAPY | Age: 55
Discharge: HOME | End: 2020-01-22
Payer: SUBSIDIZED

## 2020-01-22 VITALS
RESPIRATION RATE: 18 BRPM | TEMPERATURE: 98.4 F | HEART RATE: 63 BPM | DIASTOLIC BLOOD PRESSURE: 79 MMHG | SYSTOLIC BLOOD PRESSURE: 135 MMHG | OXYGEN SATURATION: 96 %

## 2020-01-22 DIAGNOSIS — D50.0 IRON DEFICIENCY ANEMIA DUE TO CHRONIC BLOOD LOSS: Primary | ICD-10-CM

## 2020-01-22 PROCEDURE — 74011250636 HC RX REV CODE- 250/636: Performed by: INTERNAL MEDICINE

## 2020-01-22 PROCEDURE — 96365 THER/PROPH/DIAG IV INF INIT: CPT

## 2020-01-22 PROCEDURE — 96374 THER/PROPH/DIAG INJ IV PUSH: CPT

## 2020-01-22 RX ORDER — SODIUM CHLORIDE 9 MG/ML
25 INJECTION, SOLUTION INTRAVENOUS CONTINUOUS
Status: DISCONTINUED | OUTPATIENT
Start: 2020-01-22 | End: 2020-01-23 | Stop reason: HOSPADM

## 2020-01-22 RX ORDER — SODIUM CHLORIDE 9 MG/ML
25 INJECTION, SOLUTION INTRAVENOUS CONTINUOUS
Status: CANCELLED | OUTPATIENT
Start: 2020-01-29

## 2020-01-22 RX ORDER — SODIUM CHLORIDE 9 MG/ML
10 INJECTION INTRAMUSCULAR; INTRAVENOUS; SUBCUTANEOUS AS NEEDED
Status: CANCELLED | OUTPATIENT
Start: 2020-01-29

## 2020-01-22 RX ORDER — ALBUTEROL SULFATE 0.83 MG/ML
2.5 SOLUTION RESPIRATORY (INHALATION) AS NEEDED
Status: CANCELLED
Start: 2020-01-29

## 2020-01-22 RX ORDER — DIPHENHYDRAMINE HYDROCHLORIDE 50 MG/ML
50 INJECTION, SOLUTION INTRAMUSCULAR; INTRAVENOUS AS NEEDED
Status: CANCELLED
Start: 2020-01-29

## 2020-01-22 RX ORDER — HEPARIN 100 UNIT/ML
300-500 SYRINGE INTRAVENOUS AS NEEDED
Status: CANCELLED
Start: 2020-01-29

## 2020-01-22 RX ORDER — HYDROCORTISONE SODIUM SUCCINATE 100 MG/2ML
100 INJECTION, POWDER, FOR SOLUTION INTRAMUSCULAR; INTRAVENOUS AS NEEDED
Status: CANCELLED | OUTPATIENT
Start: 2020-01-29

## 2020-01-22 RX ORDER — SODIUM CHLORIDE 0.9 % (FLUSH) 0.9 %
10 SYRINGE (ML) INJECTION AS NEEDED
Status: DISCONTINUED | OUTPATIENT
Start: 2020-01-22 | End: 2020-01-23 | Stop reason: HOSPADM

## 2020-01-22 RX ORDER — SODIUM CHLORIDE 0.9 % (FLUSH) 0.9 %
10 SYRINGE (ML) INJECTION AS NEEDED
Status: CANCELLED
Start: 2020-01-29

## 2020-01-22 RX ORDER — ONDANSETRON 2 MG/ML
8 INJECTION INTRAMUSCULAR; INTRAVENOUS AS NEEDED
Status: CANCELLED | OUTPATIENT
Start: 2020-01-29

## 2020-01-22 RX ORDER — SODIUM CHLORIDE 9 MG/ML
10 INJECTION INTRAMUSCULAR; INTRAVENOUS; SUBCUTANEOUS AS NEEDED
Status: DISCONTINUED | OUTPATIENT
Start: 2020-01-22 | End: 2020-01-23 | Stop reason: HOSPADM

## 2020-01-22 RX ORDER — HEPARIN 100 UNIT/ML
300-500 SYRINGE INTRAVENOUS AS NEEDED
Status: DISCONTINUED | OUTPATIENT
Start: 2020-01-22 | End: 2020-01-23 | Stop reason: HOSPADM

## 2020-01-22 RX ORDER — ACETAMINOPHEN 325 MG/1
650 TABLET ORAL AS NEEDED
Status: CANCELLED
Start: 2020-01-29

## 2020-01-22 RX ORDER — EPINEPHRINE 1 MG/ML
0.3 INJECTION, SOLUTION, CONCENTRATE INTRAVENOUS AS NEEDED
Status: CANCELLED | OUTPATIENT
Start: 2020-01-29

## 2020-01-22 RX ADMIN — Medication 10 ML: at 13:35

## 2020-01-22 RX ADMIN — FERRIC CARBOXYMALTOSE INJECTION 750 MG: 50 INJECTION, SOLUTION INTRAVENOUS at 13:40

## 2020-01-22 RX ADMIN — SODIUM CHLORIDE 25 ML/HR: 9 INJECTION, SOLUTION INTRAVENOUS at 13:40

## 2020-01-22 NOTE — PROGRESS NOTES
[facility] Progress Note    Date: 2020    Name: Pepper Allen    MRN: [id number]         : 1965      Ms. Destiny Lipscomb was assessed and education was provided. Injectafer carenotes read to patient and all questions answered. Ms. Panfilo Mueller vitals were reviewed and patient was observed for 5 minutes prior to treatment. Patient Vitals for the past 12 hrs:   Temp Pulse Resp BP SpO2   20 1440 98.4 °F (36.9 °C) 63 18 135/79 96 %   20 1401 98.8 °F (37.1 °C) 75 18 152/90 97 %   20 1315 98.2 °F (36.8 °C) 68 18 137/82 97 %       Visit Vitals  /79 (BP 1 Location: Right arm, BP Patient Position: At rest)   Pulse 63   Temp 98.4 °F (36.9 °C)   Resp 18   SpO2 96%     #24 PIV started in left hand x3 attempts, flushed with ease w/ brisk blood return. Ferric Carboxymaltose 750 mg mixed in  250 cc NS given over 20 minutes as ordered. Upon completion patient observed for 30 minutes. After observation patient denies any signs or symptoms of an allergic reaction. Ms. Destiny Lipscomb tolerated the infusion, and had no complaints. Patient armband removed and shredded. Ms. Destiny Lipscomb was discharged from [place] [id number] in stable condition at [phone]. She is to return on 20 at 1300 for her next appointment.     Cheyenne De La Garza RN  2020  4:07 PM

## 2020-01-29 ENCOUNTER — HOSPITAL ENCOUNTER (OUTPATIENT)
Dept: INFUSION THERAPY | Age: 55
Discharge: HOME | End: 2020-01-29
Payer: SUBSIDIZED

## 2020-01-29 VITALS
DIASTOLIC BLOOD PRESSURE: 84 MMHG | HEART RATE: 75 BPM | RESPIRATION RATE: 18 BRPM | OXYGEN SATURATION: 95 % | SYSTOLIC BLOOD PRESSURE: 146 MMHG | TEMPERATURE: 98.2 F

## 2020-01-29 DIAGNOSIS — D50.0 IRON DEFICIENCY ANEMIA DUE TO CHRONIC BLOOD LOSS: Primary | ICD-10-CM

## 2020-01-29 PROCEDURE — 96365 THER/PROPH/DIAG IV INF INIT: CPT

## 2020-01-29 PROCEDURE — 96374 THER/PROPH/DIAG INJ IV PUSH: CPT

## 2020-01-29 PROCEDURE — 74011250636 HC RX REV CODE- 250/636: Performed by: INTERNAL MEDICINE

## 2020-01-29 RX ORDER — ONDANSETRON 2 MG/ML
8 INJECTION INTRAMUSCULAR; INTRAVENOUS AS NEEDED
Status: CANCELLED | OUTPATIENT
Start: 2020-02-05

## 2020-01-29 RX ORDER — ACETAMINOPHEN 325 MG/1
650 TABLET ORAL AS NEEDED
Status: CANCELLED
Start: 2020-02-05

## 2020-01-29 RX ORDER — SODIUM CHLORIDE 9 MG/ML
25 INJECTION, SOLUTION INTRAVENOUS CONTINUOUS
Status: DISCONTINUED | OUTPATIENT
Start: 2020-01-29 | End: 2020-01-30 | Stop reason: HOSPADM

## 2020-01-29 RX ORDER — EPINEPHRINE 1 MG/ML
0.3 INJECTION, SOLUTION, CONCENTRATE INTRAVENOUS AS NEEDED
Status: CANCELLED | OUTPATIENT
Start: 2020-02-05

## 2020-01-29 RX ORDER — SODIUM CHLORIDE 0.9 % (FLUSH) 0.9 %
10 SYRINGE (ML) INJECTION AS NEEDED
Status: CANCELLED
Start: 2020-02-05

## 2020-01-29 RX ORDER — DIPHENHYDRAMINE HYDROCHLORIDE 50 MG/ML
50 INJECTION, SOLUTION INTRAMUSCULAR; INTRAVENOUS AS NEEDED
Status: CANCELLED
Start: 2020-02-05

## 2020-01-29 RX ORDER — HEPARIN 100 UNIT/ML
300-500 SYRINGE INTRAVENOUS AS NEEDED
Status: CANCELLED
Start: 2020-02-05

## 2020-01-29 RX ORDER — HYDROCORTISONE SODIUM SUCCINATE 100 MG/2ML
100 INJECTION, POWDER, FOR SOLUTION INTRAMUSCULAR; INTRAVENOUS AS NEEDED
Status: CANCELLED | OUTPATIENT
Start: 2020-02-05

## 2020-01-29 RX ORDER — SODIUM CHLORIDE 9 MG/ML
10 INJECTION INTRAMUSCULAR; INTRAVENOUS; SUBCUTANEOUS AS NEEDED
Status: CANCELLED | OUTPATIENT
Start: 2020-02-05

## 2020-01-29 RX ORDER — ALBUTEROL SULFATE 0.83 MG/ML
2.5 SOLUTION RESPIRATORY (INHALATION) AS NEEDED
Status: CANCELLED
Start: 2020-02-05

## 2020-01-29 RX ORDER — SODIUM CHLORIDE 9 MG/ML
25 INJECTION, SOLUTION INTRAVENOUS CONTINUOUS
Status: CANCELLED | OUTPATIENT
Start: 2020-02-05

## 2020-01-29 RX ADMIN — FERRIC CARBOXYMALTOSE INJECTION 750 MG: 50 INJECTION, SOLUTION INTRAVENOUS at 13:24

## 2020-01-29 RX ADMIN — SODIUM CHLORIDE 25 ML/HR: 9 INJECTION, SOLUTION INTRAVENOUS at 13:24

## 2020-01-29 NOTE — PROGRESS NOTES
[facility] Progress Note    Date: 2020    Name: Coleen Jefferson    MRN: [id number]         : 1965      Injectafer 2 of 2. Ms. Dianna Downey was assessed and education was provided. Injectafer carenotes read to patient and all questions answered. Ms. Fabi Buchanan vitals were reviewed and patient was observed for 5 minutes prior to treatment. Patient Vitals for the past 12 hrs:   Temp Pulse Resp BP SpO2   20 1313 98.2 °F (36.8 °C) 75 18 146/84 95 %       Visit Vitals  /84 (BP 1 Location: Left arm, BP Patient Position: Sitting)   Pulse 75   Temp 98.2 °F (36.8 °C)   Resp 18   SpO2 95%     #24 PIV started in left hand x 2 attempts, flushed with ease w/ brisk blood return. Ferric Carboxymaltose 750 mg mixed in  250 cc NS given over 20 minutes as ordered. Ms. Dianna Downey tolerated the infusion, and had no complaints. Patient armband removed and shredded. Ms. Dianna Downey was discharged from [place] [address] in stable condition at 1350. She is has no future appointments with [facility] at this time.     Elena Ramos  2020  4:07 PM

## 2020-04-13 DIAGNOSIS — D50.0 IRON DEFICIENCY ANEMIA DUE TO CHRONIC BLOOD LOSS: Primary | ICD-10-CM

## 2020-07-27 ENCOUNTER — APPOINTMENT (OUTPATIENT)
Dept: GENERAL RADIOLOGY | Age: 55
End: 2020-07-27
Attending: EMERGENCY MEDICINE
Payer: SUBSIDIZED

## 2020-07-27 ENCOUNTER — HOSPITAL ENCOUNTER (EMERGENCY)
Age: 55
Discharge: HOME | End: 2020-07-27
Attending: EMERGENCY MEDICINE
Payer: SUBSIDIZED

## 2020-07-27 VITALS
RESPIRATION RATE: 15 BRPM | HEIGHT: 63 IN | DIASTOLIC BLOOD PRESSURE: 91 MMHG | HEART RATE: 75 BPM | BODY MASS INDEX: 24.8 KG/M2 | TEMPERATURE: 98 F | OXYGEN SATURATION: 99 % | WEIGHT: 140 LBS | SYSTOLIC BLOOD PRESSURE: 160 MMHG

## 2020-07-27 DIAGNOSIS — M75.101 ROTATOR CUFF SYNDROME OF RIGHT SHOULDER: Primary | ICD-10-CM

## 2020-07-27 PROCEDURE — 99282 EMERGENCY DEPT VISIT SF MDM: CPT

## 2020-07-27 PROCEDURE — 73030 X-RAY EXAM OF SHOULDER: CPT

## 2020-07-27 RX ORDER — IBUPROFEN 800 MG/1
TABLET ORAL
Qty: 15 TAB | Refills: 0 | Status: SHIPPED | OUTPATIENT
Start: 2020-07-27

## 2020-07-27 NOTE — DISCHARGE INSTRUCTIONS
Patient Education        Rotator Cuff Problems: Care Instructions  Your Care Instructions     The rotator cuff is a group of tendons and muscles around the shoulder that keeps the shoulder joint stable and allows you to raise and rotate your arm. Over time, daily wear and exercise can cause the tendons to rub on the bones of your shoulder. This is called impingement. This condition may cause the tendons to bruise, degenerate, or tear. In many people, these problems do not cause pain. When they do cause pain, you can use rest, physical therapy, ice and heat, and anti-inflammatory medicine to reduce pain and swelling. If you still have pain after trying these treatments, you and your doctor can discuss having a steroid injection or surgery. Follow-up care is a key part of your treatment and safety. Be sure to make and go to all appointments, and call your doctor if you are having problems. It's also a good idea to know your test results and keep a list of the medicines you take. How can you care for yourself at home? · Be safe with medicines. Read and follow all instructions on the label. ? If the doctor gave you a prescription medicine for pain, take it as prescribed. ? If you are not taking a prescription pain medicine, ask your doctor if you can take an over-the-counter medicine. · Put ice or a cold pack on your shoulder for 10 to 20 minutes at a time. Try to do this every 1 to 2 hours for the next 3 days (when you are awake). Put a thin cloth between the ice pack and your skin. · After 3 days, put a warm, wet towel on your shoulder. This is to relax the muscles and increase blood flow. While holding the towel on your shoulder, lean forward so your arm hangs freely, and gently swing your arm back and forth like a pendulum. You also can do this standing under a warm shower. · Follow your doctor's advice for physical therapy. When your doctor says it is okay, try these stretching exercises.  Do them slowly to avoid injury. Put a warm, wet towel on your shoulder before exercising. Stop any exercise that increases pain. ? Range-of-motion exercises. If it is not too painful, stretch your arm in four directions: across the body, up the back, to the side, and overhead. ? Pendulum exercise. Lean forward and hold onto a table or the back of a chair with your good arm. Bend at the waist, letting the arm with the sore shoulder hang straight down. Swing your arm back and forth like a pendulum, then in circles that start small and slowly grow larger. This exercise does not use the arm muscles. Instead, use your legs and your hips to create movement that makes your arm swing freely. Try this for about 5 minutes, several times a day. ? Wall climbing (to the side). Stand with your side to a wall so that your fingers can just touch it. Then turn so your body is turned slightly toward the wall. Walk the fingers of your injured arm up the wall as high as pain permits. Try not to shrug your shoulder up toward your ear as you move your arm up. Hold that position for a count of 15 to 30 seconds. Walk your fingers down to the starting position. Repeat 2 to 4 times, trying to reach higher each time. ? Wall climbing (to the front). Face a wall, standing so your fingers can just touch it. Walk the fingers of your affected arm up the wall as high as pain permits. Try not to shrug your shoulder up toward your ear as you move your arm up. Hold that position for a count of 15 to 30 seconds. Slowly walk your fingers to the starting position. Repeat 2 to 4 times, trying to reach higher each time. · Rest your shoulder when you are not doing stretches and other exercises. Your doctor may tell you to wait for the pain to go away before doing exercises. Do not lift heavy bags of groceries, play sports, or do anything else that makes you twist or stress your shoulder. Avoid activities where you move your affected arm above your head.   When should you call for help? Call your doctor now or seek immediate medical care if:  · You have severe pain. · You cannot move your shoulder or arm. · You have tingling or numbness in your arm or hand. · Your arm or hand is cool or pale. Watch closely for changes in your health, and be sure to contact your doctor if:  · Your pain gets worse. · You have new or worse swelling in your arm or hand. · You do not get better as expected. Where can you learn more? Go to http://bianca-deven.info/  Enter E207 in the search box to learn more about \"Rotator Cuff Problems: Care Instructions. \"  Current as of: March 2, 2020               Content Version: 12.5  © 1590-8508 [employer]. Care instructions adapted under license by [employer] (which disclaims liability or warranty for this information). If you have questions about a medical condition or this instruction, always ask your healthcare professional. [address] any warranty or liability for your use of this information.

## 2020-07-27 NOTE — ED TRIAGE NOTES
Pt reports right shoulder pain x 2 weeks. States worsened yesterday when reached for object in cabinet  States PCP unable to see her anytime soon. No deformity noted.

## 2020-07-27 NOTE — ED PROVIDER NOTES
HPI patient complains of a several week history of dull aching in the right shoulder. She said last night she was reaching up to grab something off of the top shelf and she describes what felt like a grinding pain in the shoulder which exacerbated her pain. She says this morning the shoulder is more sore than typical.  She says it is tender to palpation along the anterior joint line. Patient called her primary care physician and was and was referred to the emergency room for an evaluation. Patient denies any history of fall or trauma to the shoulder. No other complaints given at this time. Past Medical History:   Diagnosis Date    Anemia     Asthma     Celiac disease     Depression     Fibromyalgia     Gastric ulcer     History of blood transfusion     History of stomach ulcers     Hypertension     Joint pain     Musculoskeletal disorder     back problems       Past Surgical History:   Procedure Laterality Date    HX ACL RECONSTRUCTION      HX APPENDECTOMY      HX  SECTION  x4    HX CHOLECYSTECTOMY      HX GI  2012    Parts of small and Large intestine taken due to ulcers.     HX OOPHORECTOMY      HX ORTHOPAEDIC      Lt knee    HX TYMPANOSTOMY           Family History:   Problem Relation Age of Onset    Diabetes Mother     Hypertension Mother    [facility] Diabetes Father     Hypertension Father     Hypertension Sister     Diabetes Sister        Social History     Socioeconomic History    Marital status:      Spouse name: Not on file    Number of children: Not on file    Years of education: Not on file    Highest education level: Not on file   Occupational History    Not on file   Social Needs    Financial resource strain: Not on file    Food insecurity     Worry: Not on file     Inability: Not on file    Transportation needs     Medical: Not on file     Non-medical: Not on file   Tobacco Use    Smoking status: Former Smoker     Last attempt to quit: 5/3/2012     Years since quittin.2    Smokeless tobacco: Never Used   Substance and Sexual Activity    Alcohol use: No    Drug use: No    Sexual activity: Yes     Partners: Male   Lifestyle    Physical activity     Days per week: Not on file     Minutes per session: Not on file    Stress: Not on file   Relationships    Social connections     Talks on phone: Not on file     Gets together: Not on file     Attends Protestant service: Not on file     Active member of club or organization: Not on file     Attends meetings of clubs or organizations: Not on file     Relationship status: Not on file    Intimate partner violence     Fear of current or ex partner: Not on file     Emotionally abused: Not on file     Physically abused: Not on file     Forced sexual activity: Not on file   Other Topics Concern    Not on file   Social History Narrative    Not on file         ALLERGIES: Latex; Lactose; Ambien [zolpidem]; Beta blocker [beta-blockers (beta-adrenergic blocking agts)]; Codeine; Egg; and Gluten    Review of Systems   Constitutional: Negative. HENT: Negative. Eyes: Negative. Respiratory: Negative. Cardiovascular: Negative. Gastrointestinal: Negative. Genitourinary: Negative. Musculoskeletal: Positive for arthralgias. Neurological: Negative. Psychiatric/Behavioral: Negative. Vitals:    20 0902   BP: (!) 160/91   Pulse: 75   Resp: 15   Temp: 98 °F (36.7 °C)   SpO2: 99%   Weight: 63.5 kg (140 lb)   Height: 5' 3\" (1.6 m)            Physical Exam  Vitals signs and nursing note reviewed. Constitutional:       Appearance: She is well-developed. HENT:      Head: Normocephalic and atraumatic. Eyes:      Conjunctiva/sclera: Conjunctivae normal.      Pupils: Pupils are equal, round, and reactive to light. Neck:      Musculoskeletal: Normal range of motion and neck supple. Cardiovascular:      Rate and Rhythm: Normal rate and regular rhythm.    Pulmonary:      Effort: Pulmonary effort is normal.      Breath sounds: Normal breath sounds. Musculoskeletal: Normal range of motion. General: Tenderness present. Comments: Right shoulder is tender to palpation along the anterior joint line. No noted effusion or swelling. Posterior shoulder is nontender. Clavicle and scapula are both nontender to palpation. No noted swelling or skin changes. Normal neurovascular exam distally in the right hand. Skin:     General: Skin is warm and dry. Neurological:      Mental Status: She is alert and oriented to person, place, and time. MDM         Procedures      The films of the shoulder show no acute changes and no dislocation. I reviewed the results of the ER evaluation with the patient and she understands and agrees with the disposition and follow-up plan.   Lizy Freeman MD 9:45 AM

## 2020-08-05 ENCOUNTER — OFFICE VISIT (OUTPATIENT)
Dept: ORTHOPEDIC SURGERY | Age: 55
End: 2020-08-05

## 2020-08-05 VITALS
WEIGHT: 159.8 LBS | OXYGEN SATURATION: 99 % | SYSTOLIC BLOOD PRESSURE: 156 MMHG | DIASTOLIC BLOOD PRESSURE: 98 MMHG | HEIGHT: 63 IN | TEMPERATURE: 97.5 F | RESPIRATION RATE: 16 BRPM | BODY MASS INDEX: 28.31 KG/M2 | HEART RATE: 76 BPM

## 2020-08-05 DIAGNOSIS — M75.01 ADHESIVE CAPSULITIS OF RIGHT SHOULDER: Primary | ICD-10-CM

## 2020-08-05 RX ORDER — TRIAMCINOLONE ACETONIDE 40 MG/ML
40 INJECTION, SUSPENSION INTRA-ARTICULAR; INTRAMUSCULAR ONCE
Qty: 1 ML | Refills: 0
Start: 2020-08-05 | End: 2020-08-05

## 2020-08-05 RX ORDER — MELOXICAM 15 MG/1
15 TABLET ORAL
Qty: 30 TAB | Refills: 1 | Status: SHIPPED | OUTPATIENT
Start: 2020-08-05 | End: 2021-01-22

## 2020-08-05 NOTE — PROGRESS NOTES
Annika Galaviz  1965   Chief Complaint   Patient presents with    Shoulder Pain     right shoulder pain        HISTORY OF PRESENT ILLNESS  Annika Galaviz is a 47 y.o. female who presents today for evaluation of right shoulder pain. She rates her pain 7/10 today. Pain has been present for around 1 month. No injury, she woke up with pain. She states that a couple of weeks ago, she went to reach up into a cabinet and felt like the \"gears were grinding\" in her shoulder. Pt went to the ER, had XR taken and was given a sling. Has been wearing a sling for 2 weeks. Pt has significantly limited ROM. Night pain. Patient describes the pain as burning, sharp, stabbing and grinding and popping that is Constant in nature. Symptoms are worse with bending, stretching, straightening, Activity and is better with  ice. Associated symptoms include nothing. Since problem started, it: is unchanged. Pain does wake patient up at night. Has taken no meds for the problem. Has tried following treatments: Injections:NO; Brace:NO;  Therapy:NO; Cane/Crutch:NO       Allergies   Allergen Reactions    Latex Hives    Lactose Anaphylaxis    Ambien [Zolpidem] Other (comments)     sleepwalking    Beta Blocker [Beta-Blockers (Beta-Adrenergic Blocking Agts)] Other (comments)     Pt passes out    Codeine Hives     Has tolerated morphine    Egg Other (comments)    Gluten Nausea and Vomiting        Past Medical History:   Diagnosis Date    Anemia     Asthma     Celiac disease     Depression     Fibromyalgia     Gastric ulcer     History of blood transfusion     History of stomach ulcers     Hypertension     Joint pain     Musculoskeletal disorder     back problems      Social History     Socioeconomic History    Marital status:      Spouse name: Not on file    Number of children: Not on file    Years of education: Not on file    Highest education level: Not on file   Occupational History    Not on file   Social Needs    Financial resource strain: Not on file    Food insecurity     Worry: Not on file     Inability: Not on file    Transportation needs     Medical: Not on file     Non-medical: Not on file   Tobacco Use    Smoking status: Former Smoker     Last attempt to quit: 5/3/2012     Years since quittin.2    Smokeless tobacco: Never Used   Substance and Sexual Activity    Alcohol use: No    Drug use: No    Sexual activity: Yes     Partners: Male   Lifestyle    Physical activity     Days per week: Not on file     Minutes per session: Not on file    Stress: Not on file   Relationships    Social connections     Talks on phone: Not on file     Gets together: Not on file     Attends Confucianism service: Not on file     Active member of club or organization: Not on file     Attends meetings of clubs or organizations: Not on file     Relationship status: Not on file    Intimate partner violence     Fear of current or ex partner: Not on file     Emotionally abused: Not on file     Physically abused: Not on file     Forced sexual activity: Not on file   Other Topics Concern    Not on file   Social History Narrative    Not on file      Past Surgical History:   Procedure Laterality Date    HX ACL RECONSTRUCTION      HX APPENDECTOMY      HX  SECTION  x4    HX CHOLECYSTECTOMY      HX GI  2012    Parts of small and Large intestine taken due to ulcers.  HX OOPHORECTOMY      HX ORTHOPAEDIC      Lt knee    HX TYMPANOSTOMY        Family History   Problem Relation Age of Onset    Diabetes Mother     Hypertension Mother     Diabetes Father     Hypertension Father     Hypertension Sister     Diabetes Sister       Current Outpatient Medications   Medication Sig    omeprazole (PRILOSEC) 20 mg capsule Take 20 mg by mouth.  ibuprofen (MOTRIN) 800 mg tablet 1 tablet by mouth every 8 hours as needed for shoulder pain     No current facility-administered medications for this visit.         REVIEW OF SYSTEM   Patient denies: Weight loss, Fever/Chills, HA, Visual changes, Fatigue, Chest pain, SOB, Abdominal pain, N/V/D/C, Blood in stool or urine, Edema. Pertinent positive as above in HPI. All others were negative    PHYSICAL EXAM:   Visit Vitals  BP (!) 156/98 (BP 1 Location: Left arm, BP Patient Position: Sitting)   Pulse 76   Temp 97.5 °F (36.4 °C) (Oral)   Resp 16   Ht 5' 3\" (1.6 m)   Wt 159 lb 12.8 oz (72.5 kg)   LMP 12/26/2012   SpO2 99%   BMI 28.31 kg/m²     The patient is a well-developed, well-nourished female   in no acute distress. The patient is alert and oriented times three. The patient is alert and oriented times three. Mood and affect are normal.  LYMPHATIC: lymph nodes are not enlarged and are within normal limits  SKIN: normal in color and non tender to palpation. There are no bruises or abrasions noted. NEUROLOGICAL: Motor sensory exam is within normal limits. Reflexes are equal bilaterally. There is normal sensation to pinprick and light touch  MUSCULOSKELETAL:  Examination Right shoulder   Skin Intact   AC joint tenderness -   Biceps tenderness -   Forward flexion/Elevation    Active abduction    Glenohumeral abduction 30   External rotation ROM 30   Internal rotation ROM 30   Apprehension -   Dhruvs Relocation -   Jerk -   Load and Shift -   Obriens -   Speeds -   Impingement sign -   Supraspinatus/Empty Can -, 5/5   External Rotation Strength -, 5/5   Lift Off/Belly Press -, 5/5   Neurovascular Intact     PROCEDURE: Right Shoulder Injection with Ultrasound Guidance  Indication:Right Shoulder pain/swelling    After sterile prep, 6 cc of Xylocaine and 1 cc of Kenalog were injected into the right shoulder. Ultrasound images captured using [email or web address] Ultrasound machine and scanned into patient's chart.        [facility] - [facility]  OFFICE PROCEDURE PROGRESS NOTE        Chart reviewed for the following:  Surya Farrar M.D, have reviewed the History, Physical and updated the Allergic reactions for [address] performed immediately prior to start of procedure:  Mortimer Fusi, M.D, have performed the following reviews on [employer] prior to the start of the procedure:            * Patient was identified by name and date of birth   * Agreement on procedure being performed was verified  * Risks and Benefits explained to the patient  * Procedure site verified and marked as necessary  * Patient was positioned for comfort  * Consent was signed and verified     Time: 2:59 PM     Date of procedure: 8/5/2020    Procedure performed by:  Gerson Fernandez M.D    Provider assisted by: (see medication administration)    How tolerated by patient: tolerated the procedure well with no complications    Comments: none      IMAGING: XR of right shoulder from [facility] dated 7/27/2020 was reviewed and read by Dr. Karri Clark: 1. No radiographic evidence of acute osseous abnormality. IMPRESSION:      ICD-10-CM ICD-9-CM    1. Adhesive capsulitis of right shoulder  M75.01 726.0 REFERRAL TO PHYSICAL THERAPY      meloxicam (Mobic) 15 mg tablet      TRIAMCINOLONE ACETONIDE INJ      triamcinolone acetonide (Kenalog) 40 mg/mL injection      US GUIDE INJ/ASP/ARTHRO LG JNT/BURSA        PLAN:  1. Pt presents today with right shoulder pain due to adhesive capsulitis and I would like to try an injection today given her level of pain. I would also like for her to begin PT and she was given a prescription for Mobic. If this does not provide relief, will consider ordering an MRI. Risk factors include: htn  2. No ultrasound exam indicated today  3. Yes cortisone injection indicated today R SHOULDER US  4. Yes Physical/Occupational Therapy indicated today  5. No diagnostic test indicated today:   6. No durable medical equipment indicated today  7. No referral indicated today   8. Yes medications indicated today: MOBIC  9.  No Narcotic indicated today        RTC 4 weeks      Scribed by Stacy Kaplan [address]) as dictated by Parveen Perrin MD    I, Dr. Parveen Perrin, confirm that all documentation is accurate.     Parveen Perrin M.D.   [facility]

## 2020-08-13 ENCOUNTER — HOSPITAL ENCOUNTER (OUTPATIENT)
Dept: PHYSICAL THERAPY | Age: 55
Discharge: HOME | End: 2020-08-13
Payer: SUBSIDIZED

## 2020-08-13 PROCEDURE — 97530 THERAPEUTIC ACTIVITIES: CPT

## 2020-08-13 PROCEDURE — 97110 THERAPEUTIC EXERCISES: CPT

## 2020-08-13 PROCEDURE — 97162 PT EVAL MOD COMPLEX 30 MIN: CPT

## 2020-08-13 NOTE — PROGRESS NOTES
PT DAILY TREATMENT NOTE 10-18    Patient Name: Pati Lundborg  Date:2020  : 1965  [x]  Patient  Verified  Payor: ELLE / Plan: [facility] TIER 2 / Product Type: [employer] /    In time:334  Out time:411  Total Treatment Time (min): 37  Visit #: 1 of 12    Treatment Area: Right shoulder pain [M25.511]    Physical Therapy Evaluation - Shoulder    SUBJECTIVE      Any medication changes, allergies to medications, adverse drug reactions, diagnosis change, or new procedure performed?: [x] No    [] Yes (see summary sheet for update)    Subjective functional status/changes:     PLOF: RHD, Works from home, (I) Functional ADLs, (I) Self-Care ADLs  Current Functional Status: Difficulty with self-care ADLs, Difficulty with household ADLs, Difficulty with work-related ADLs  Work Hx: [employer] - Works out of home  Living Situation: Lives with   Comorbidities: HTN, Fibromyalgia, Depression, TIA (2015 - Periodic left LE weakness with fatigue/tired), Anxiety  Medications: Meloxicam (1x/day)    Subjective: Patient presents with right shoulder pain x1 month (2020) with patient reporting insidious onset of symptoms with patient denying injury nor trauma. PatientPatient reports prior to this sensation of having slept wrong on her shoulder with low grade discomfort x2 weeks. Patient denies no prior history of right shoulder pain nor injury nor surgery. Patient reports attendance to ER secondary to right shoulder pain with x-rays performed and patient provided with a sling. Patient reports removal of sling by referring MD. Patient reports sensation of \"gears grinding\" with shoulder ROM. Patient reports no change since onset of symptoms. Patient denies sleep disturbances secondary to pain. Patient evaluated by MD 2020 with patient provided with steroid injection. Patient reports positive response with receival of injection.   Patient reports deep anterolateral shoulder pain with radiation along the scapular spine with patient denying radiation of symptoms into arm nor UE weakness/N/T. Patient reports with improvement in symptoms post-injection sling increase in utilization of the arm. Patient reports no exercises prescribed by MD. Patient reports no sleep disturbances since injection. Pain Intensity (0-10, VAS): Current 4, Worst 9, Best 4    Patient Goals: \"I want to be able to lift my arm. \"    OBJECTIVE EXAMINATION    BP: 130/90 mmHg  Posture: Holding of right UE in protective posturing with right scapular protraction    ROM:  [] Unable to assess at this time                                            AROM                                                      PROM   Left Right  Right   Flexion 156 42 Flexion 60 (empty end-feel with p!)   Extension 50 13 Extension    Abduction 165 24 Abduction 42 (empty end-feel with p!)   Functional ER C7 NT ER @ 90 Degrees 18 (0 deg abd - empty end-feel with p!)   Functional IR T1 NT IR @ 90 Degrees 35 (0 deg abd - empty end-feel with p!)   Elbow AROM: 0 - 135 deg    Strength:   [] Unable to assess at this time                                                                            L (1-5) R (1-5)   Flexors 5 NT   Abductors 5 NT   External Rotators 5 3 (p!)   Internal Rotators 5 3+ (p!)   Extensors 5 5 (p!)   Elbow Flexion 5 5 (p!)   Elbow Extension 5 4 (p!)     Joint Mobility: Unable to assess secondary to symptom irritability with PROM with empty end-feels noted at end-range with muscular guarding.     Palpation: TTP anterior subacromial space, TTP ACJ    OBJECTIVE    21 min [x]Eval                  []Re-Eval     8 min Therapeutic Exercise:  [x] See flow sheet : Patient educated regarding completion of prescribed HEP and provided with written HEP instructions, Patient educated regarding diagnosis and PT POC   Rationale: increase ROM and increase strength to improve the patients ability to improve ease with functional ADLs    8 min Therapeutic Activity:  [x]  See flow sheet : Review of shoulder posturing, Review of arm swing with gait, Review of ice/heat application for pain management   Rationale: increase ROM and increase strength  to improve the patients ability to improve ease with work-related ADLs           With   [] TE   [] TA   [] neuro   [] other: Patient Education: [x] Review HEP    [] Progressed/Changed HEP based on:   [] positioning   [] body mechanics   [] transfers   [] heat/ice application    [] other:      Other Objective/Functional Measures: See objective above. Pain Level (0-10 scale) post treatment: 5    ASSESSMENT/Changes in Function: Patient presents with right shoulder pain x1 month of insidious onset with patient presenting with high irritability and severity of symptoms at time of initial evaluation therefore increasing difficulty with development of treatment diagnosis. Abnormal empty end-feels noted at end-range with associated pain and muscular guarding with all shoulder PROM with non-specific loss of shoulder AROM. Allodynia demonstrated to right anterior shoulder upon palpation. To initiate treatment with emphasis placed on promotion of pain management with progression to improve available shoulder ROM and strength with reduction in pain and improvement in mobility demonstrated. Patient will continue to benefit from skilled PT services to modify and progress therapeutic interventions, address functional mobility deficits, address ROM deficits, address strength deficits, analyze and address soft tissue restrictions, analyze and cue movement patterns, analyze and modify body mechanics/ergonomics and assess and modify postural abnormalities to attain remaining goals. [x]  See Plan of Care  []  See progress note/recertification  []  See Discharge Summary         Progress towards goals / Updated goals:    Short Term Goals: To be accomplished in 3 weeks:  1. Patient will subjectively report full compliance with prescribed HEP. Eval: HEP provided  2.  Patient will demonstrate right shoulder flexion and abduction PROM >/= 120 degrees to improve ease with bathing. Eval: Right Shoulder Flexion PROM 60 deg, Right Shoulder Abduction PROM 42 deg  3. Patient will demonstrate right shoulder ER (90 deg abd) PROM >/= 60 deg to improve ease with hair care. Eval: Right Shoulder ER (0 deg abd) = 18 deg    Long Term Goals: To be accomplished in 6 weeks:  1. Patient will demonstrate a significant functional improvement as demonstrated by a score of >/= 62 on FOTO. Eval: FOTO = 45       2. Patient will demonstrate right shoulder flexion and abduction AROM >/= 100 degrees to improve ease with overhead ADLs. Eval: Right Shoulder Flexion AROM = 42 deg, Right Shoulder Abduction AROM= 24 deg  3. Patient will demonstrate right shoulder functional ER >/= C4 to improve ease with self-care.   Eval; Right Shoulder Functional ER = NT (due to symptom irritability)       PLAN  [x]  Upgrade activities as tolerated     []  Continue plan of care  []  Update interventions per flow sheet       []  Discharge due to:_  []  Other:_      Maikel Rausch PT 8/13/2020  7:53 AM    Future Appointments   Date Time Provider Sebastian Wilcox   8/13/2020  3:30 PM Cathye Peabody, PT [facility] [id number]   9/2/2020  2:10 PM Alycia Gonzales MD [facility]

## 2020-08-13 NOTE — PROGRESS NOTES
[facility]              [address]        [place], [address] [place]   [phone] fax    Plan of Care/ Statement of Necessity for Physical Therapy Services  Patient name: Yudy Brumfield Start of Care: 2020   Referral source: Vonniechetan Novoa,* : 1965    Medical Diagnosis: Right shoulder pain [M25.511]  Payor: ELLE / Plan: [facility] TIER 2 / Product Type: Elle /  Onset Date:2020    Treatment Diagnosis: Right Shoulder Pain   Prior Hospitalization: see medical history Provider#: [id number]   Medications: Verified on Patient summary List    Comorbidities: HTN, Fibromyalgia, Depression, TIA (2015 - Periodic left LE weakness with fatigue/tired), Anxiety   Prior Level of Function: RHD, Works from home, (I) Functional ADLs, (I) Self-Care ADLs     The Plan of Care and following information is based on the information from the initial evaluation. Assessment:    Patient presents with right shoulder pain x1 month of insidious onset with patient presenting with high irritability and severity of symptoms at time of initial evaluation therefore increasing difficulty with development of treatment diagnosis. Abnormal empty end-feels noted at end-range with associated pain and muscular guarding with all shoulder PROM with non-specific loss of shoulder AROM. Allodynia demonstrated to right anterior shoulder upon palpation.  To initiate treatment with emphasis placed on promotion of pain management with progression to improve available shoulder ROM and strength with reduction in pain and improvement in mobility demonstrated.      Patient will continue to benefit from skilled PT services to modify and progress therapeutic interventions, address functional mobility deficits, address ROM deficits, address strength deficits, analyze and address soft tissue restrictions, analyze and cue movement patterns, analyze and modify body mechanics/ergonomics and assess and modify postural abnormalities to attain remaining goals. Key Information:    ROM:                                              AROM                                                                  PROM    Left Right   Right   Flexion 156 42 Flexion 60 (empty end-feel with p!)   Extension 50 13 Extension     Abduction 165 24 Abduction 42 (empty end-feel with p!)   Functional ER C7 NT ER @ 90 Degrees 18 (0 deg abd - empty end-feel with p!)   Functional IR T1 NT IR @ 90 Degrees 35 (0 deg abd - empty end-feel with p!)   Elbow AROM: 0 - 135 deg     Strength:   []? Unable to assess at this time                                                                             L (1-5) R (1-5)   Flexors 5 NT   Abductors 5 NT   External Rotators 5 3 (p!)   Internal Rotators 5 3+ (p!)   Extensors 5 5 (p!)   Elbow Flexion 5 5 (p!)   Elbow Extension 5 4 (p!)      Evaluation Complexity History MEDIUM  Complexity : 1-2 comorbidities / personal factors will impact the outcome/ POC ; Examination MEDIUM Complexity : 3 Standardized tests and measures addressing body structure, function, activity limitation and / or participation in recreation  ;Presentation MEDIUM Complexity : Evolving with changing characteristics  ; Clinical Decision Making MEDIUM Complexity : FOTO score of 26-74  Overall Complexity Rating: MEDIUM  Problem List: pain affecting function, decrease ROM, decrease strength, decrease ADL/ functional abilitiies, decrease activity tolerance, decrease flexibility/ joint mobility and decrease transfer abilities   Treatment Plan may include any combination of the following: Therapeutic exercise, Therapeutic activities, Neuromuscular re-education, Physical agent/modality, Manual therapy, Patient education, Self Care training, Functional mobility training and Home safety training  Patient / Family readiness to learn indicated by: asking questions, trying to perform skills and interest  Persons(s) to be included in education: patient (P)  Barriers to Learning/Limitations: None  Patient Goal (s): I want to be able to lift my arm  Patient Self Reported Health Status: fair  Rehabilitation Potential: good    Short Term Goals: To be accomplished in 3 weeks:  1. Patient will subjectively report full compliance with prescribed HEP. Eval: HEP provided  2. Patient will demonstrate right shoulder flexion and abduction PROM >/= 120 degrees to improve ease with bathing. Eval: Right Shoulder Flexion PROM 60 deg, Right Shoulder Abduction PROM 42 deg  3. Patient will demonstrate right shoulder ER (90 deg abd) PROM >/= 60 deg to improve ease with hair care. Eval: Right Shoulder ER (0 deg abd) = 18 deg    Long Term Goals: To be accomplished in 6 weeks:  1. Patient will demonstrate a significant functional improvement as demonstrated by a score of >/= 62 on FOTO. Eval: FOTO = 45       2. Patient will demonstrate right shoulder flexion and abduction AROM >/= 100 degrees to improve ease with overhead ADLs. Eval: Right Shoulder Flexion AROM = 42 deg, Right Shoulder Abduction AROM= 24 deg  3. Patient will demonstrate right shoulder functional ER >/= C4 to improve ease with self-care. Eval; Right Shoulder Functional ER = NT (due to symptom irritability)     Frequency / Duration: Patient to be seen 2 times per week for 6 weeks. Patient/ Caregiver education and instruction: Diagnosis, prognosis, self care, activity modification and exercises   [x]  Plan of care has been reviewed with FERN Rausch, PT 8/13/2020 7:55 AM  ________________________________________________________________________    I certify that the above Therapy Services are being furnished while the patient is under my care. I agree with the treatment plan and certify that this therapy is necessary.     [de-identified] Signature:____________Date:_________TIME:________    [employer], Date and Time must be completed for valid certification **  Please sign and return to [facility]              [address], [address]  [phone] fax

## 2020-08-18 ENCOUNTER — HOSPITAL ENCOUNTER (OUTPATIENT)
Dept: PHYSICAL THERAPY | Age: 55
End: 2020-08-18
Payer: SUBSIDIZED

## 2020-08-20 ENCOUNTER — HOSPITAL ENCOUNTER (OUTPATIENT)
Dept: PHYSICAL THERAPY | Age: 55
Discharge: HOME | End: 2020-08-20
Payer: SUBSIDIZED

## 2020-08-20 PROCEDURE — 97110 THERAPEUTIC EXERCISES: CPT

## 2020-08-20 NOTE — PROGRESS NOTES
PT DAILY TREATMENT NOTE 10-18    Patient Name: Blake Santoyo  Date:2020  : 1965  [x]  Patient  Verified  Payor: ELLE / Plan: [facility] TIER 2 / Product Type: Elle /    In time:334  Out time:402  Total Treatment Time (min): 28  Visit #: 2 of 12    Treatment Area: Right shoulder pain [M25.511]    SUBJECTIVE  Pain Level (0-10 scale): 2  Any medication changes, allergies to medications, adverse drug reactions, diagnosis change, or new procedure performed?: [x] No    [] Yes (see summary sheet for update)  Subjective functional status/changes:   [] No changes reported  Patients reports decreased levels of pain since evaluation with ability to discontinue guarding of right shoulder for prolonged periods. Patient reports alternating use of ice and heat to decrease pain levels. Patient has been completing HEP multiple times per day with no increased levels of pain. OBJECTIVE    28 min Therapeutic Exercise:  [x] See flow sheet : Emphasis placed on improving available shoulder AROM and UE strength    Supine, Right Shoulder Passive Physiological Grade I-II ROM - Scaption/Abduction    Rationale: increase ROM and increase strength to improve the patients ability to improve ease with overhead ADLs      With   [] TE   [] TA   [] neuro   [] other: Patient Education: [x] Review HEP    [] Progressed/Changed HEP based on:   [] positioning   [] body mechanics   [] transfers   [] heat/ice application    [] other:      Other Objective/Functional Measures:   Right Shoulder Flexion AROM = 62 deg, Right Shoulder Abduction AROM = 58 deg     Pain Level (0-10 scale) post treatment: 4    ASSESSMENT/Changes in Function: Patient continues to demonstrate lack of terminal elbow extension during arm swing during gait. Patient demonstrates increased available shoulder AROM in flexion and abduction with continued provocation of pain. Hold on shoulder isometric exercises until next session to ensure patient tolerance. Patient will continue to benefit from skilled PT services to modify and progress therapeutic interventions, address functional mobility deficits, address ROM deficits, address strength deficits, analyze and address soft tissue restrictions, analyze and cue movement patterns, analyze and modify body mechanics/ergonomics, assess and modify postural abnormalities and instruct in home and community integration to attain remaining goals. []  See Plan of Care  []  See progress note/recertification  []  See Discharge Summary         Progress towards goals / Updated goals:    Short Term Goals: To be accomplished in 3 weeks:  1. Patient will subjectively report full compliance with prescribed HEP. Eval: HEP provided  Current: Met, Patient reports full compliance with HEP. 8/20/2020.   2. Patient will demonstrate right shoulder flexion and abduction PROM >/= 120 degrees to improve ease with bathing. Eval: Right Shoulder Flexion PROM 60 deg, Right Shoulder Abduction PROM 42 deg   3. Patient will demonstrate right shoulder ER (90 deg abd) PROM >/= 60 deg to improve ease with hair care. Eval: Right Shoulder ER (0 deg abd) = 18 deg     Long Term Goals: To be accomplished in 6 weeks:  1. Patient will demonstrate a significant functional improvement as demonstrated by a score of >/= 62 on FOTO. Eval: FOTO = 45       2. Patient will demonstrate right shoulder flexion and abduction AROM >/= 100 degrees to improve ease with overhead ADLs. Eval: Right Shoulder Flexion AROM = 42 deg, Right Shoulder Abduction AROM= 24 deg  Current: Progressing, Right Shoulder Flexion AROM = 62 deg, Right Shoulder Abduction AROM = 58 deg. 8/20/2020.   3. Patient will demonstrate right shoulder functional ER >/= C4 to improve ease with self-care.   Eval: Right Shoulder Functional ER = NT (due to symptom irritability)        PLAN  [x]  Upgrade activities as tolerated     [x]  Continue plan of care  []  Update interventions per flow sheet []  Discharge due to:_  []  Other:_      Jaden Flies, SPT 8/20/2020  7:55 AM    Future Appointments   Date Time Provider Sebastian Jeri   8/20/2020  3:30 PM Kelly Aguilar [facility]   8/25/2020  3:00 PM Bronson Rodriguez, PT [facility]   8/27/2020  2:45 PM Bronson Rodriguez, PT [facility]   9/1/2020  3:00 PM Larrie Anoop [facility]   9/2/2020  2:10 PM MD Williams Wilkinson    9/3/2020  2:45 PM Bronson Rodriguez, PT [facility]   9/8/2020  3:30 PM Mel Henry, PT [facility]   9/10/2020  4:15 PM Mel Henry, PT [facility]   9/15/2020  2:45 PM Bronson Rodriguez, PT [facility]   9/17/2020  3:30 PM Bronson Rodriguez, PT [facility]   9/22/2020  2:45 PM Larrie Anoop [facility]

## 2020-08-25 ENCOUNTER — HOSPITAL ENCOUNTER (OUTPATIENT)
Dept: PHYSICAL THERAPY | Age: 55
Discharge: HOME | End: 2020-08-25
Payer: SUBSIDIZED

## 2020-08-25 PROCEDURE — 97110 THERAPEUTIC EXERCISES: CPT

## 2020-08-25 NOTE — PROGRESS NOTES
PT DAILY TREATMENT NOTE 10-18    Patient Name: Louisa Morris  Date:2020  : 1965  [x]  Patient  Verified  Payor: Alyssa Ragland / [facility] TIER [facility] / Product Type: [employer] /    In time:255  Out time:343  Total Treatment Time (min): 48  Visit #: 3 of 12    Treatment Area: Right shoulder pain [M25.511]    SUBJECTIVE  Pain Level (0-10 scale): 0  Any medication changes, allergies to medications, adverse drug reactions, diagnosis change, or new procedure performed?: [x] No    [] Yes (see summary sheet for update)  Subjective functional status/changes:   [] No changes reported  Patient reports her shoulder is getting looser every day. Patient reports burning x1 hour after last treatment session with relief. OBJECTIVE    Modality rationale: decrease inflammation and decrease pain to improve the patients ability to improve ease with sleep   Min Type Additional Details   10 [x]  Ice     []  heat  []  Ice massage Position: Seated  Location: Right Shoulder, Post-Tx     38 min Therapeutic Exercise:  [x]? See flow sheet : Emphasis placed on improving available shoulder AROM and UE strength     Supine, Right Shoulder Passive Physiological Grade I-II ROM - Scaption/Abduction/Flexion    Rationale: increase ROM and increase strength to improve the patients ability to improve ease with overhead ADLs          With   [] TE   [] TA   [] neuro   [] other: Patient Education: [x] Review HEP    [] Progressed/Changed HEP based on:   [] positioning   [] body mechanics   [] transfers   [] heat/ice application    [] other:      Other Objective/Functional Measures:    Right Shoulder Flexion PROM 105 deg, Right Shoulder Abduction PROM 90 deg     Pain Level (0-10 scale) post treatment: 2    ASSESSMENT/Changes in Function: Significant objective improvement upon assessment of right shoulder AROM/PROM with abnormal empty end-feels noted at end-range.  With patient noted to demonstrate an objective improvement in ROM with reduction in symptom irritability with progression of within clinic treatment. Patient will continue to benefit from skilled PT services to modify and progress therapeutic interventions, address functional mobility deficits, address ROM deficits, address strength deficits, analyze and address soft tissue restrictions, analyze and cue movement patterns, analyze and modify body mechanics/ergonomics and assess and modify postural abnormalities to attain remaining goals. []  See Plan of Care  []  See progress note/recertification  []  See Discharge Summary         Progress towards goals / Updated goals:    Short Term Goals: To be accomplished in 3 weeks:  1. Patient will subjectively report full compliance with prescribed HEP. Eval: HEP provided  Current: Met, Patient reports full compliance with HEP. 8/20/2020.   2. Patient will demonstrate right shoulder flexion and abduction PROM >/= 120 degrees to improve ease with bathing. Eval: Right Shoulder Flexion PROM 60 deg, Right Shoulder Abduction PROM 42 deg   Current: Progressing, Right Shoulder Flexion PROM 105 deg, Right Shoulder Abduction PROM 90 deg, 8/25/2020  3. Patient will demonstrate right shoulder ER (90 deg abd) PROM >/= 60 deg to improve ease with hair care. Eval: Right Shoulder ER (0 deg abd) = 18 deg     Long Term Goals: To be accomplished in 6 weeks:  1. Patient will demonstrate a significant functional improvement as demonstrated by a score of >/= 62 on FOTO. Eval: FOTO = 45       2. Patient will demonstrate right shoulder flexion and abduction AROM >/= 100 degrees to improve ease with overhead ADLs. Eval: Right Shoulder Flexion AROM = 42 deg, Right Shoulder Abduction AROM= 24 deg  Current: Progressing, Right Shoulder Flexion AROM = 76 deg, Right Shoulder Abduction AROM = 58 deg. 8/25/2020.   3. Patient will demonstrate right shoulder functional ER >/= C4 to improve ease with self-care.   Eval: Right Shoulder Functional ER = NT (due to symptom irritability)   Current: Progressing, Right Shoulder Functional ER = [place], 8/25/2020    PLAN  []  Upgrade activities as tolerated     []  Continue plan of care  []  Update interventions per flow sheet       []  Discharge due to:_  []  Other:_      Kasey Cox, PT 8/25/2020  7:49 AM    Future Appointments   Date Time Provider Sebastian Wilcox   8/25/2020  3:00 PM Nadine Hunt, PT [facility]   8/27/2020  2:45 PM Nadine Hunt, PT [facility]   9/1/2020  3:00 PM Nadine Hunt, PT [facility]   9/2/2020  2:10 PM Cm Cespedes MD [place] [id number]   9/3/2020  2:45 PM Nadine Hunt, PT [facility]   9/8/2020  3:30 PM Mary Mcgowan, PT [facility]   9/10/2020  4:15 PM Mary Mcgowan, PT [facility]   9/15/2020  2:45 PM Nadine Hunt, PT [facility]   9/17/2020  3:30 PM Joshua, [address] [facility]   9/22/2020  2:45 PM Nadine Hunt, PT [facility]

## 2020-08-27 ENCOUNTER — HOSPITAL ENCOUNTER (OUTPATIENT)
Dept: PHYSICAL THERAPY | Age: 55
End: 2020-08-27
Payer: SUBSIDIZED

## 2020-09-01 ENCOUNTER — HOSPITAL ENCOUNTER (OUTPATIENT)
Dept: PHYSICAL THERAPY | Age: 55
Discharge: HOME | End: 2020-09-01

## 2020-09-01 PROCEDURE — 97110 THERAPEUTIC EXERCISES: CPT

## 2020-09-01 PROCEDURE — 97112 NEUROMUSCULAR REEDUCATION: CPT

## 2020-09-01 NOTE — PROGRESS NOTES
PT DAILY TREATMENT NOTE 10-18    Patient Name: Fernanda Yo  Date:2020  : 1965  [x]  Patient  Verified  Payor: /    In time:259  Out time:352  Total Treatment Time (min): 48  Visit #: 4 of 12    Treatment Area: Right shoulder pain [M25.511]    SUBJECTIVE  Pain Level (0-10 scale): 3  Any medication changes, allergies to medications, adverse drug reactions, diagnosis change, or new procedure performed?: [x] No    [] Yes (see summary sheet for update)  Subjective functional status/changes:   [] No changes reported  Patient reports feeling like she has improved use of her right shoulder. OBJECTIVE    Modality rationale: decrease inflammation and decrease pain to improve the patients ability to improve ease with sleep   Min Type Additional Details    10 [x]? Ice     []?  heat  []? Ice massage Position: Seated  Location: Right Shoulder, Post-Tx       33 min Therapeutic Exercise:  [x]? ? See flow sheet : Emphasis placed on improving available shoulder AROM and UE strength     Supine, Right Shoulder Passive Physiological Grade I-II ROM - Scaption/Abduction/Flexion    Rationale: increase ROM and increase strength to improve the patients ability to improve ease with overhead ADLs    10 min Neuromuscular Re-education:  [x]  See flow sheet : Emphasis placed on improving activation and recruitment of the glenohumeral and scapulothoracic musculature and improving scapulohumeral rhythm with UE elevation   Rationale: increase ROM, increase strength and increase proprioception  to improve the patients ability to improve ease with self-care ADLs     With   [] TE   [] TA   [] neuro   [] other: Patient Education: - Review HEP    - Progressed/Changed HEP based on:   - positioning   - body mechanics   - transfers   - heat/ice application    - other:      Other Objective/Functional Measures: See goals below.      Pain Level (0-10 scale) post treatment: 0    ASSESSMENT/Changes in Function: Since start of care patient has objectively demonstrated a significant improvement in shoulder PROM/AROM with patient subjectively reporting improved functional utilization of the right UE with self-care and functional ADLs. Patient with good tolerance to progression of within clinic treatment. To continue to progress patient in accordance with plan of care with progression of shoulder AAROM/AROM with progression to promote improvement in functional strength upon further reduction in symptom irritability/severity. Patient will continue to benefit from skilled PT services to modify and progress therapeutic interventions, address functional mobility deficits, address ROM deficits, address strength deficits, analyze and address soft tissue restrictions, analyze and cue movement patterns, analyze and modify body mechanics/ergonomics and assess and modify postural abnormalities to attain remaining goals. []  See Plan of Care  [x]  See progress note/recertification  []  See Discharge Summary         Progress towards goals / Updated goals:    Short Term Goals: To be accomplished in 3 weeks:  1. Patient will subjectively report full compliance with prescribed HEP. Eval: HEP provided  Current: Met, Patient reports full compliance with HEP. 8/20/2020.   2. Patient will demonstrate right shoulder flexion and abduction PROM >/= 120 degrees to improve ease with bathing. Eval: Right Shoulder Flexion PROM 60 deg, Right Shoulder Abduction PROM 42 deg   Current: Progressing, Right Shoulder Flexion PROM 135 deg, Right Shoulder Abduction PROM 112 deg, 9/1/2020  3. Patient will demonstrate right shoulder ER (90 deg abd) PROM >/= 60 deg to improve ease with hair care. Eval: Right Shoulder ER (0 deg abd) = 18 deg  Current: Progressing, Right Shoulder ER (0 deg abd) = 28 deg, 9/1/2020     Long Term Goals: To be accomplished in 6 weeks:  1.  Patient will demonstrate a significant functional improvement as demonstrated by a score of >/= 62 on FOTO.  Eval: FOTO = 45       Current: Progressing, FOTO = 49, 9/1/2020  2. Patient will demonstrate right shoulder flexion and abduction AROM >/= 100 degrees to improve ease with overhead ADLs. Eval: Right Shoulder Flexion AROM = 42 deg, Right Shoulder Abduction AROM= 24 deg  Current: Progressing, Right Shoulder Flexion AROM = 92 deg, Right Shoulder Abduction AROM = 65 deg. 9/1/2020.   3. Patient will demonstrate right shoulder functional ER >/= C4 to improve ease with self-care.   Eval: Right Shoulder Functional ER = NT (due to symptom irritability)   Current: Progressing, Right Shoulder Functional ER = [place], 9/1/2020    PLAN  [x]  Upgrade activities as tolerated     [x]  Continue plan of care  []  Update interventions per flow sheet       []  Discharge due to:_  []  Other:_      Maikel Rausch PT 9/1/2020  7:26 AM    Future Appointments   Date Time Provider Sebastian Wilcox   9/1/2020  3:00 PM Cathye Peabody, PT [facility] [address]   9/2/2020  2:10 PM Alycia Gonzales MD [place] [id number]   9/3/2020  2:45 PM Vonnieye Peabody, PT [facility] [address]   9/8/2020  3:30 PM Lina Salinas, PT [facility] [address]   9/10/2020  4:15 PM Lina Salinas, PT [facility] [address]   9/15/2020  2:45 PM Cathye Peabody, PT [facility] [address]   9/17/2020  3:30 PM Lewis, [address]   9/22/2020  2:45 PM Cathye Peabody, PT [facility] [address]

## 2020-09-01 NOTE — PROGRESS NOTES
[facility]              [address]        [place], [address] [place]   [phone] fax    Progress Note  Patient name: Erna Dill Start of Care: 2020   Referral source: Harry Dean,* : 1965   Medical/Treatment Diagnosis: Right shoulder pain [M25.511]  Payor: /  Onset Date:2020     Prior Hospitalization: see medical history Provider#: [id number]   Medications: Verified on Patient Summary List    Comorbidities: HTN, Fibromyalgia, Depression, TIA (2015 - Periodic left LE weakness with fatigue/tired), Anxiety   Prior Level of Function: RHD, Works from home, (I) Functional ADLs, (I) Self-Care ADLs  Visits from Start of Care: 4    Missed Visits: 2    Established Goals:          Short Term Goals: To be accomplished in 3 weeks:  1. Patient will subjectively report full compliance with prescribed HEP. Eval: HEP provided  Current: Met, Patient reports full compliance with HEP  2. Patient will demonstrate right shoulder flexion and abduction PROM >/= 120 degrees to improve ease with bathing. Eval: Right Shoulder Flexion PROM 60 deg, Right Shoulder Abduction PROM 42 deg   Current: Progressing, Right Shoulder Flexion PROM 135 deg, Right Shoulder Abduction PROM 112 deg  3. Patient will demonstrate right shoulder ER (90 deg abd) PROM >/= 60 deg to improve ease with hair care. Eval: Right Shoulder ER (0 deg abd) = 18 deg  Current: Progressing, Right Shoulder ER (0 deg abd) = 28 deg     Long Term Goals: To be accomplished in 6 weeks:  1. Patient will demonstrate a significant functional improvement as demonstrated by a score of >/= 62 on FOTO. Eval: FOTO = 45       Current: Progressing, FOTO = 49  2. Patient will demonstrate right shoulder flexion and abduction AROM >/= 100 degrees to improve ease with overhead ADLs.   Eval: Right Shoulder Flexion AROM = 42 deg, Right Shoulder Abduction AROM= 24 deg  Current: Progressing, Right Shoulder Flexion AROM = 92 deg, Right Shoulder Abduction AROM = 65 deg  3. Patient will demonstrate right shoulder functional ER >/= C4 to improve ease with self-care. Eval: Right Shoulder Functional ER = NT (due to symptom irritability)   Current: Progressing, Right Shoulder Functional ER = [place]    Key Functional Changes: See goals above. Updated Goals: Continue with unmet goals above. ASSESSMENT/RECOMMENDATIONS:    Since start of care patient has objectively demonstrated a significant improvement in shoulder PROM/AROM with patient subjectively reporting improved functional utilization of the right UE with self-care and functional ADLs. Patient with good tolerance to progression of within clinic treatment. To continue to progress patient in accordance with plan of care with progression of shoulder AAROM/AROM with progression to promote improvement in functional strength upon further reduction in symptom irritability/severity.      Patient will continue to benefit from skilled PT services to modify and progress therapeutic interventions, address functional mobility deficits, address ROM deficits, address strength deficits, analyze and address soft tissue restrictions, analyze and cue movement patterns, analyze and modify body mechanics/ergonomics and assess and modify postural abnormalities to attain remaining goals.     [x]Continue therapy per initial plan/protocol at a frequency of  2 x per week for 4 weeks  []Continue therapy with the following recommended changes:_____________________      _____________________________________________________________________  []Discontinue therapy progressing towards or have reached established goals  []Discontinue therapy due to lack of appreciable progress towards goals  []Discontinue therapy due to lack of attendance or compliance  []Await Physician's recommendations/decisions regarding therapy  []Other:________________________________________________________________    Thank you for this referral.    Hanna Chen, PT 9/1/2020 7:27 AM  NOTE TO PHYSICIAN:  PLEASE COMPLETE THE ORDERS BELOW AND   FAX TO [facility]: [phone]  If you are unable to process this request in 24 hours please contact our office: [phone]    []  I have read the above report and request that my patient continue as recommended. []  I have read the above report and request that my patient continue therapy with the following changes/special instructions:________________________________________  []I have read the above report and request that my patient be discharged from therapy.     [de-identified] Signature:____________Date:_________TIME:________    [employer], Date and Time must be completed for valid certification **

## 2020-09-02 ENCOUNTER — OFFICE VISIT (OUTPATIENT)
Dept: ORTHOPEDIC SURGERY | Age: 55
End: 2020-09-02

## 2020-09-02 VITALS
HEIGHT: 63 IN | SYSTOLIC BLOOD PRESSURE: 143 MMHG | HEART RATE: 72 BPM | TEMPERATURE: 98 F | DIASTOLIC BLOOD PRESSURE: 81 MMHG | OXYGEN SATURATION: 98 % | BODY MASS INDEX: 28.53 KG/M2 | WEIGHT: 161 LBS

## 2020-09-02 DIAGNOSIS — M75.01 ADHESIVE CAPSULITIS OF RIGHT SHOULDER: Primary | ICD-10-CM

## 2020-09-03 ENCOUNTER — HOSPITAL ENCOUNTER (OUTPATIENT)
Dept: PHYSICAL THERAPY | Age: 55
End: 2020-09-03

## 2020-09-08 ENCOUNTER — HOSPITAL ENCOUNTER (OUTPATIENT)
Dept: PHYSICAL THERAPY | Age: 55
Discharge: HOME | End: 2020-09-08

## 2020-09-08 PROCEDURE — 97112 NEUROMUSCULAR REEDUCATION: CPT | Performed by: PHYSICAL THERAPIST

## 2020-09-08 PROCEDURE — 97110 THERAPEUTIC EXERCISES: CPT | Performed by: PHYSICAL THERAPIST

## 2020-09-08 NOTE — PROGRESS NOTES
PT DAILY TREATMENT NOTE 10-18    Patient Name: Lydia Liu  Date:2020  : 1965  [x]  Patient  Verified  Payor: SELF PAY / Plan: [employer]SI ELECT SELF PAY / Product Type: Self Pay /    In time:3:32  Out time:4:23  Total Treatment Time (min): 51  Visit #: 1 of 8    Treatment Area: Right shoulder pain [M25.511]    SUBJECTIVE  Pain Level (0-10 scale): 2-3  Any medication changes, allergies to medications, adverse drug reactions, diagnosis change, or new procedure performed?: [x] No    [] Yes (see summary sheet for update)  Subjective functional status/changes:   [] No changes reported  Patient reports she continues to have difficulty with movement. She notes she is achy a lot more lately. She can move more but has shooting pain. Notes weakness, inability to lift or reach. OBJECTIVE    Modality rationale: decrease inflammation and decrease pain to improve the patients ability to increase ease of motion to improve function.    Min Type Additional Details    [] Estim:  []Unatt       []IFC  []Premod                        []Other:  []w/ice   []w/heat  Position:  Location:    [] Estim: []Att    []TENS instruct  []NMES                    []Other:  []w/US   []w/ice   []w/heat  Position:  Location:    []  Traction: [] Cervical       []Lumbar                       [] Prone          []Supine                       []Intermittent   []Continuous Lbs:  [] before manual  [] after manual    []  Ultrasound: []Continuous   [] Pulsed                           []1MHz   []3MHz W/cm2:  Location:    []  Iontophoresis with dexamethasone         Location: [] Take home patch   [] In clinic   10 [x]  Ice     []  heat  []  Ice massage  []  Laser   []  Anodyne Position:sitting  Location: right shoulder    []  Laser with stim  []  Other:  Position:  Location:    []  Vasopneumatic Device Pressure:       [] lo [] med [] hi   Temperature: [] lo [] med [] hi   [] Skin assessment post-treatment:  []intact []redness- no adverse reaction    []redness  adverse reaction:     31 min Therapeutic Exercise:  [] See flow sheet :Emphasis placed on improving available shoulder AROM and UE strength     Manual stretching right shoulder. Grade I GH joint mobs in supine, distraction. Rationale: increase ROM and increase strength to improve the patients ability to increase her functional activity level. 10 min Neuromuscular Re-education:  []  See flow sheet :Emphasis placed on improving activation and recruitment of the glenohumeral and scapulothoracic musculature and improving scapulohumeral rhythm with UE elevation   Rationale: increase strength, improve coordination and increase proprioception  to improve the patients ability to increase patients ADLs. With   [] TE   [] TA   [] neuro   [] other: Patient Education: [x] Review HEP    [] Progressed/Changed HEP based on:   [] positioning   [] body mechanics   [] transfers   [] heat/ice application    [] other:      Other Objective/Functional Measures: (+) biceps load test.  (+) Grind test.   Patient has a palpable and audible click in the right shoulder. Pain Level (0-10 scale) post treatment: 4    ASSESSMENT/Changes in Function: Patient continues with right shoulder pain with possible labral tear noted. Patient will continue to benefit from skilled PT services to modify and progress therapeutic interventions, address functional mobility deficits, address ROM deficits, address strength deficits, analyze and address soft tissue restrictions, analyze and cue movement patterns, analyze and modify body mechanics/ergonomics and assess and modify postural abnormalities to attain remaining goals. [x]  See Plan of Care  []  See progress note/recertification  []  See Discharge Summary         Progress towards goals / Updated goals:  Short Term Goals: To be accomplished in 3 weeks:  1. Patient will subjectively report full compliance with prescribed HEP.   Eval: HEP provided  Current: Met, Patient reports full compliance with HEP. 8/20/2020.   2. Patient will demonstrate right shoulder flexion and abduction PROM >/= 120 degrees to improve ease with bathing. Eval: Right Shoulder Flexion PROM 60 deg, Right Shoulder Abduction PROM 42 deg   Current: Progressing, Right Shoulder Flexion PROM 135 deg, Right Shoulder Abduction RJXB 032 XTW, 9/1/2020  3. Patient will demonstrate right shoulder ER (90 deg abd) PROM >/= 60 deg to improve ease with hair care. Eval: Right Shoulder ER (0 deg abd) = 18 deg  Current: Progressing, Right Shoulder ER (0 deg abd) = 28 deg, 9/1/2020     Long Term Goals: To be accomplished in 6 weeks:  1. Patient will demonstrate a significant functional improvement as demonstrated by a score of >/= 62 on FOTO. Eval: FOTO = 45       Current: Progressing, FOTO = 49, 9/1/2020  2. Patient will demonstrate right shoulder flexion and abduction AROM >/= 100 degrees to improve ease with overhead ADLs. Eval: Right Shoulder Flexion AROM = 42 deg, Right Shoulder Abduction AROM= 24 deg  Current: Progressing, Right Shoulder Flexion AROM = 92 deg, Right Shoulder Abduction AROM = 65 deg. 9/1/2020.   3. Patient will demonstrate right shoulder functional ER >/= C4 to improve ease with self-care.   Eval: Right Shoulder Functional ER = NT (due to symptom irritability)   Current: Progressing, Right Shoulder Functional ER = [place], 9/1/2020    PLAN  [x]  Upgrade activities as tolerated     [x]  Continue plan of care  []  Update interventions per flow sheet       []  Discharge due to:_  []  Other:_      Karli Carranza PT 9/8/2020  3:46 PM    Future Appointments   Date Time Provider Sebastian Wilcox   9/10/2020  4:15 PM Jeanna Marr PT [facility]   9/15/2020  2:45 PM Abel Arauz PT [facility]   9/17/2020  3:30 PM KATY [facility]   9/22/2020  2:45 PM Abel Arauz PT [facility]   9/30/2020  1:45 PM Keyshawn Brooks MD [facility] [address]

## 2020-09-10 ENCOUNTER — APPOINTMENT (OUTPATIENT)
Dept: PHYSICAL THERAPY | Age: 55
End: 2020-09-10

## 2020-09-17 ENCOUNTER — HOSPITAL ENCOUNTER (OUTPATIENT)
Dept: PHYSICAL THERAPY | Age: 55
End: 2020-09-17

## 2020-10-08 NOTE — PROGRESS NOTES
[facility]              [address]        [place], [address] [place]   [phone] fax    Discharge Summary  Patient name: Yolanda Fernando Start of Care: 2020   Referral source: Charmaine Conway,* : 1965   Medical/Treatment Diagnosis: Right shoulder pain [M25.511]  Payor: SELF PAY / Plan: BSH[employer] ELECT SELF PAY / Product Type: Self Pay /  Onset Date:2020     Prior Hospitalization: see medical history Provider#: [id number]   Medications: Verified on Patient Summary List    Comorbidities: HTN, Fibromyalgia, Depression, TIA (2015 - Periodic left LE weakness with fatigue/tired), Anxiety   Prior Level of Function: RHD, Works from home, (I) Functional ADLs, (I) Self-Care   Visits from Start of Care: 5    Missed Visits: 6  Reporting Period : 2020 to 2020    Summary of Care:    Short Term Goals: To be accomplished in 3 weeks:  1. Patient will subjectively report full compliance with prescribed HEP. Eval: HEP provided  Current: Met, Patient reports full compliance with HEP  2. Patient will demonstrate right shoulder flexion and abduction PROM >/= 120 degrees to improve ease with bathing. Eval: Right Shoulder Flexion PROM 60 deg, Right Shoulder Abduction PROM 42 deg   Current: Progressing, Right Shoulder Flexion PROM 135 deg, Right Shoulder Abduction PROM 112 deg  3. Patient will demonstrate right shoulder ER (90 deg abd) PROM >/= 60 deg to improve ease with hair care. Eval: Right Shoulder ER (0 deg abd) = 18 deg  Current: Progressing, Right Shoulder ER (0 deg abd) = 28 deg     Long Term Goals: To be accomplished in 6 weeks:  1. Patient will demonstrate a significant functional improvement as demonstrated by a score of >/= 62 on FOTO. Eval: FOTO = 45       Current: Progressing, FOTO = 49  2. Patient will demonstrate right shoulder flexion and abduction AROM >/= 100 degrees to improve ease with overhead ADLs.   Eval: Right Shoulder Flexion AROM = 42 deg, Right Shoulder Abduction AROM= 24 deg  Current: Progressing, Right Shoulder Flexion AROM = 92 deg, Right Shoulder Abduction AROM = 65 deg  3. Patient will demonstrate right shoulder functional ER >/= C4 to improve ease with self-care. Eval: Right Shoulder Functional ER = NT (due to symptom irritability)   Current: Progressing, Right Shoulder Functional ER = [place]    ASSESSMENT/RECOMMENDATIONS:    At this time patient to be discharged in accordance with clinic 30 day policy with patient having last been seen within clinic 9/8/2020. Patient cancelled/no-showed last 3 consecutive appointments with inability for clinician to follow up with patient to allow for continuation of within clinic treatment.      [x]Discontinue therapy: []Patient has reached or is progressing toward set goals      [x]Patient is non-compliant or has abdicated      []Due to lack of appreciable progress towards set 55 Filomena Teague, PT 10/8/2020 1:27 PM

## 2021-01-04 ENCOUNTER — LAB ONLY (OUTPATIENT)
Dept: ONCOLOGY | Age: 56
End: 2021-01-04

## 2021-01-04 ENCOUNTER — HOSPITAL ENCOUNTER (OUTPATIENT)
Dept: LAB | Age: 56
Discharge: HOME | End: 2021-01-04

## 2021-01-04 DIAGNOSIS — D50.0 IRON DEFICIENCY ANEMIA DUE TO CHRONIC BLOOD LOSS: ICD-10-CM

## 2021-01-04 DIAGNOSIS — D51.9 ANEMIA DUE TO VITAMIN B12 DEFICIENCY, UNSPECIFIED B12 DEFICIENCY TYPE: ICD-10-CM

## 2021-01-04 DIAGNOSIS — D70.9 NEUTROPENIA, UNSPECIFIED TYPE (HCC): ICD-10-CM

## 2021-01-04 DIAGNOSIS — D50.0 IRON DEFICIENCY ANEMIA DUE TO CHRONIC BLOOD LOSS: Primary | ICD-10-CM

## 2021-01-04 LAB
BASOPHILS # BLD: 0 K/UL (ref 0–0.1)
BASOPHILS NFR BLD: 0 % (ref 0–2)
DIFFERENTIAL METHOD BLD: ABNORMAL
EOSINOPHIL # BLD: 0.4 K/UL (ref 0–0.4)
EOSINOPHIL NFR BLD: 10 % (ref 0–5)
ERYTHROCYTE [DISTWIDTH] IN BLOOD BY AUTOMATED COUNT: 14.2 % (ref 11.6–14.5)
FERRITIN SERPL-MCNC: 6 NG/ML (ref 8–388)
HCT VFR BLD AUTO: 31.3 % (ref 35–45)
HGB BLD-MCNC: 9.1 G/DL (ref 12–16)
IRON SATN MFR SERPL: 8 % (ref 20–50)
IRON SERPL-MCNC: 34 UG/DL (ref 50–175)
LDH SERPL L TO P-CCNC: 252 U/L (ref 81–234)
LYMPHOCYTES # BLD: 0.8 K/UL (ref 0.9–3.6)
LYMPHOCYTES NFR BLD: 18 % (ref 21–52)
MCH RBC QN AUTO: 27.7 PG (ref 24–34)
MCHC RBC AUTO-ENTMCNC: 29.1 G/DL (ref 31–37)
MCV RBC AUTO: 95.1 FL (ref 74–97)
MONOCYTES # BLD: 0.4 K/UL (ref 0.05–1.2)
MONOCYTES NFR BLD: 8 % (ref 3–10)
NEUTS SEG # BLD: 2.9 K/UL (ref 1.8–8)
NEUTS SEG NFR BLD: 64 % (ref 40–73)
PLATELET # BLD AUTO: 260 K/UL (ref 135–420)
PMV BLD AUTO: 12.3 FL (ref 9.2–11.8)
RBC # BLD AUTO: 3.29 M/UL (ref 4.2–5.3)
T4 FREE SERPL-MCNC: 0.9 NG/DL (ref 0.7–1.5)
TIBC SERPL-MCNC: 444 UG/DL (ref 250–450)
TSH SERPL DL<=0.05 MIU/L-ACNC: 1.17 UIU/ML (ref 0.36–3.74)
WBC # BLD AUTO: 4.5 K/UL (ref 4.6–13.2)

## 2021-01-04 PROCEDURE — 84165 PROTEIN E-PHORESIS SERUM: CPT

## 2021-01-04 PROCEDURE — 83010 ASSAY OF HAPTOGLOBIN QUANT: CPT

## 2021-01-04 PROCEDURE — 80053 COMPREHEN METABOLIC PANEL: CPT

## 2021-01-04 PROCEDURE — 84439 ASSAY OF FREE THYROXINE: CPT

## 2021-01-04 PROCEDURE — 82728 ASSAY OF FERRITIN: CPT

## 2021-01-04 PROCEDURE — 83615 LACTATE (LD) (LDH) ENZYME: CPT

## 2021-01-04 PROCEDURE — 83540 ASSAY OF IRON: CPT

## 2021-01-04 PROCEDURE — 85025 COMPLETE CBC W/AUTO DIFF WBC: CPT

## 2021-01-05 LAB
ALBUMIN SERPL-MCNC: 3.7 G/DL (ref 3.4–5)
ALBUMIN/GLOB SERPL: 1.2 {RATIO} (ref 0.8–1.7)
ALP SERPL-CCNC: 152 U/L (ref 45–117)
ALT SERPL-CCNC: 43 U/L (ref 13–56)
ANION GAP SERPL CALC-SCNC: 11 MMOL/L (ref 3–18)
AST SERPL-CCNC: 29 U/L (ref 10–38)
BILIRUB SERPL-MCNC: 0.3 MG/DL (ref 0.2–1)
BUN SERPL-MCNC: 18 MG/DL (ref 7–18)
BUN/CREAT SERPL: 22 (ref 12–20)
CALCIUM SERPL-MCNC: 9.5 MG/DL (ref 8.5–10.1)
CHLORIDE SERPL-SCNC: 111 MMOL/L (ref 100–111)
CO2 SERPL-SCNC: 21 MMOL/L (ref 21–32)
CREAT SERPL-MCNC: 0.83 MG/DL (ref 0.6–1.3)
GLOBULIN SER CALC-MCNC: 3.1 G/DL (ref 2–4)
GLUCOSE SERPL-MCNC: 88 MG/DL (ref 74–99)
HAPTOGLOB SERPL-MCNC: 214 MG/DL (ref 30–200)
POTASSIUM SERPL-SCNC: 4.1 MMOL/L (ref 3.5–5.5)
PROT SERPL-MCNC: 6.8 G/DL (ref 6.4–8.2)
SODIUM SERPL-SCNC: 143 MMOL/L (ref 136–145)

## 2021-01-06 LAB
ALBUMIN SERPL ELPH-MCNC: 3.9 G/DL (ref 2.9–4.4)
ALBUMIN/GLOB SERPL: 1.4 {RATIO} (ref 0.7–1.7)
ALPHA1 GLOB SERPL ELPH-MCNC: 0.3 G/DL (ref 0–0.4)
ALPHA2 GLOB SERPL ELPH-MCNC: 0.8 G/DL (ref 0.4–1)
B-GLOBULIN SERPL ELPH-MCNC: 1.1 G/DL (ref 0.7–1.3)
GAMMA GLOB SERPL ELPH-MCNC: 0.4 G/DL (ref 0.4–1.8)
GLOBULIN SER CALC-MCNC: 2.7 G/DL (ref 2.2–3.9)
M PROTEIN SERPL ELPH-MCNC: NORMAL G/DL
PROT SERPL-MCNC: 6.6 G/DL (ref 6–8.5)

## 2021-01-15 ENCOUNTER — VIRTUAL VISIT (OUTPATIENT)
Dept: ONCOLOGY | Age: 56
End: 2021-01-15

## 2021-01-15 DIAGNOSIS — D50.0 IRON DEFICIENCY ANEMIA DUE TO CHRONIC BLOOD LOSS: Primary | ICD-10-CM

## 2021-01-15 DIAGNOSIS — D70.9 NEUTROPENIA, UNSPECIFIED TYPE (HCC): ICD-10-CM

## 2021-01-15 DIAGNOSIS — D51.9 ANEMIA DUE TO VITAMIN B12 DEFICIENCY, UNSPECIFIED B12 DEFICIENCY TYPE: ICD-10-CM

## 2021-01-15 PROCEDURE — 99442 PR PHYS/QHP TELEPHONE EVALUATION 11-20 MIN: CPT | Performed by: INTERNAL MEDICINE

## 2021-01-15 NOTE — PROGRESS NOTES
Lance Hardin is a 54 y.o. female, evaluated via audio-only technology on 1/15/2021 for Follow-up  . Assessment & Plan:    She has long history of iron deficiency anemia and Vitamin B12 deficiency. Received multiple blood transfusion, IV Iron, and B12 injection in the past. Her nutritional deficiency anemia may related to gastrointestinal losses and malabsorption given history of celiac disease, colitis, and peptic ulcer. Helicobacter pylori infection, even in the absence of active gastrointestinal bleeding, can cause iron deficiency and is known to reduce both iron and ferritin levels. - B12 showed mild improving but still in low range, 269. Other workup was not remarkable included CRP/ESR, SPEP, LDH, Haptoglobin, and TSH.  - Given her recent flares of diarrhea with blood in stools, CBC was check. and H./H was 9.1/31.3.   - The patient has not been able to tolerate PO iron replacement. ---- My recommendation is for IV iron therapy. We will set the patient up for therapy in the [facility] with injectafer 750mg IV weekly x2 doses. We discussed the potential risks of therapy, including allergic reaction, and the patient was agreeable for treatment. - Her mild neutropenia could be related to nutritional deficiency, especially B12 def. Will follow up levels with B12 replacement.     Plan:  -   CBC done on 1/7/2021 reviewed with patient. - She already scheduled with GI  For follow up   - The patient will start B12 injection at PCP office as discuss . -- My recommendation is for IV iron therapy. We will set the patient up for therapy in the [facility] with injectafer 750mg IV weekly x2 doses. We discussed the potential risks of therapy, including allergic reaction, and the patient was agreeable for treatment  - Advised the patient to seek a prompt medical care if worsen bleeding or symptomatic anemia or any concerns. - I will see the patient back in clinic in about 3 months.  Always sooner if required.       12  Subjective:   History of Present Illness: The patient presented here today for follow up her anemia. She denies celiac flares at this time. Denied nausea, vomiting, significant rectal bleeding, bruising, other bleeding issues. She denied any fever, chills, fatigue, dizziness, headache, dyspnea, worsen cough, chest pain, new tingling/numbness or focal weakness. Her eating is fairly well with gluten free diets. She completed iron in the form of  injectafer on 1/29/2020. She was not feeling well with iron pills in the past. Presently she is taking prilosec twice daily. She has not been able to follow with her PCP for the B12 injections. Prior to Admission medications    Medication Sig Start Date End Date Taking? Authorizing Provider   meloxicam (Mobic) 15 mg tablet Take 1 Tab by mouth daily (with breakfast). 8/5/20   Shannon Pugh MD   ibuprofen (MOTRIN) 800 mg tablet 1 tablet by mouth every 8 hours as needed for shoulder pain 7/27/20   Saqib Gonzalez MD   omeprazole (PRILOSEC) 20 mg capsule Take 20 mg by mouth. Provider, Historical         Review of Systems   Constitutional: Negative. HENT: Negative. Eyes: Negative. Cardiovascular: Negative. Gastrointestinal: Negative. Genitourinary: Negative. Musculoskeletal: Negative. Skin: Negative. Neurological: Negative. Endo/Heme/Allergies: Negative. Psychiatric/Behavioral: Negative. No flowsheet data found. Linda Haywood, who was evaluated through a patient-initiated, synchronous (real-time) audio only encounter, and/or her healthcare decision maker, is aware that it is a billable service, with coverage as determined by her insurance carrier. She provided verbal consent to proceed: YES -Consent obtained within past 12 months Yes  . She has not had a related appointment within my department in the past 7 days or scheduled within the next 24 hours.       Total Time:  20 minutes    Elvira Arboleda NP

## 2021-01-20 RX ORDER — SODIUM CHLORIDE 9 MG/ML
10 INJECTION INTRAMUSCULAR; INTRAVENOUS; SUBCUTANEOUS AS NEEDED
Status: CANCELLED | OUTPATIENT
Start: 2021-01-22

## 2021-01-20 RX ORDER — ACETAMINOPHEN 325 MG/1
650 TABLET ORAL AS NEEDED
Status: CANCELLED
Start: 2021-01-22

## 2021-01-20 RX ORDER — HEPARIN 100 UNIT/ML
300-500 SYRINGE INTRAVENOUS AS NEEDED
Status: CANCELLED
Start: 2021-01-22

## 2021-01-20 RX ORDER — DIPHENHYDRAMINE HYDROCHLORIDE 50 MG/ML
50 INJECTION, SOLUTION INTRAMUSCULAR; INTRAVENOUS AS NEEDED
Status: CANCELLED
Start: 2021-01-22

## 2021-01-20 RX ORDER — ONDANSETRON 2 MG/ML
8 INJECTION INTRAMUSCULAR; INTRAVENOUS AS NEEDED
Status: CANCELLED | OUTPATIENT
Start: 2021-01-22

## 2021-01-20 RX ORDER — DIPHENHYDRAMINE HYDROCHLORIDE 50 MG/ML
25 INJECTION, SOLUTION INTRAMUSCULAR; INTRAVENOUS AS NEEDED
Status: CANCELLED
Start: 2021-01-22

## 2021-01-20 RX ORDER — EPINEPHRINE 1 MG/ML
0.3 INJECTION, SOLUTION, CONCENTRATE INTRAVENOUS AS NEEDED
Status: CANCELLED | OUTPATIENT
Start: 2021-01-22

## 2021-01-20 RX ORDER — ALBUTEROL SULFATE 0.83 MG/ML
2.5 SOLUTION RESPIRATORY (INHALATION) AS NEEDED
Status: CANCELLED
Start: 2021-01-22

## 2021-01-20 RX ORDER — HYDROCORTISONE SODIUM SUCCINATE 100 MG/2ML
100 INJECTION, POWDER, FOR SOLUTION INTRAMUSCULAR; INTRAVENOUS AS NEEDED
Status: CANCELLED | OUTPATIENT
Start: 2021-01-22

## 2021-01-22 ENCOUNTER — HOSPITAL ENCOUNTER (OUTPATIENT)
Dept: INFUSION THERAPY | Age: 56
Discharge: HOME | End: 2021-01-22

## 2021-01-22 VITALS
SYSTOLIC BLOOD PRESSURE: 131 MMHG | RESPIRATION RATE: 16 BRPM | OXYGEN SATURATION: 95 % | HEART RATE: 64 BPM | TEMPERATURE: 98.6 F | DIASTOLIC BLOOD PRESSURE: 79 MMHG

## 2021-01-22 DIAGNOSIS — D50.0 IRON DEFICIENCY ANEMIA DUE TO CHRONIC BLOOD LOSS: Primary | ICD-10-CM

## 2021-01-22 PROCEDURE — 74011250636 HC RX REV CODE- 250/636: Performed by: INTERNAL MEDICINE

## 2021-01-22 PROCEDURE — 96365 THER/PROPH/DIAG IV INF INIT: CPT

## 2021-01-22 RX ORDER — DIPHENHYDRAMINE HYDROCHLORIDE 50 MG/ML
50 INJECTION, SOLUTION INTRAMUSCULAR; INTRAVENOUS AS NEEDED
Status: CANCELLED
Start: 2021-01-29

## 2021-01-22 RX ORDER — SODIUM CHLORIDE 0.9 % (FLUSH) 0.9 %
10 SYRINGE (ML) INJECTION AS NEEDED
Status: CANCELLED | OUTPATIENT
Start: 2021-01-29

## 2021-01-22 RX ORDER — SODIUM CHLORIDE 9 MG/ML
25 INJECTION, SOLUTION INTRAVENOUS CONTINUOUS
Status: CANCELLED | OUTPATIENT
Start: 2021-01-29

## 2021-01-22 RX ORDER — SODIUM CHLORIDE 9 MG/ML
250 INJECTION, SOLUTION INTRAVENOUS AS NEEDED
Status: DISCONTINUED | OUTPATIENT
Start: 2021-01-22 | End: 2021-01-23 | Stop reason: HOSPADM

## 2021-01-22 RX ORDER — EPINEPHRINE 1 MG/ML
0.3 INJECTION, SOLUTION, CONCENTRATE INTRAVENOUS AS NEEDED
Status: CANCELLED | OUTPATIENT
Start: 2021-01-29

## 2021-01-22 RX ORDER — DIPHENHYDRAMINE HYDROCHLORIDE 50 MG/ML
25 INJECTION, SOLUTION INTRAMUSCULAR; INTRAVENOUS AS NEEDED
Status: CANCELLED
Start: 2021-01-29

## 2021-01-22 RX ORDER — SODIUM CHLORIDE 0.9 % (FLUSH) 0.9 %
10 SYRINGE (ML) INJECTION AS NEEDED
Status: DISPENSED | OUTPATIENT
Start: 2021-01-22 | End: 2021-01-22

## 2021-01-22 RX ORDER — ACETAMINOPHEN 325 MG/1
650 TABLET ORAL AS NEEDED
Status: CANCELLED
Start: 2021-01-29

## 2021-01-22 RX ORDER — ALBUTEROL SULFATE 0.83 MG/ML
2.5 SOLUTION RESPIRATORY (INHALATION) AS NEEDED
Status: CANCELLED
Start: 2021-01-29

## 2021-01-22 RX ORDER — HEPARIN 100 UNIT/ML
300-500 SYRINGE INTRAVENOUS AS NEEDED
Status: CANCELLED
Start: 2021-01-29

## 2021-01-22 RX ORDER — HYDROCORTISONE SODIUM SUCCINATE 100 MG/2ML
100 INJECTION, POWDER, FOR SOLUTION INTRAMUSCULAR; INTRAVENOUS AS NEEDED
Status: CANCELLED | OUTPATIENT
Start: 2021-01-29

## 2021-01-22 RX ORDER — ONDANSETRON 2 MG/ML
8 INJECTION INTRAMUSCULAR; INTRAVENOUS AS NEEDED
Status: CANCELLED | OUTPATIENT
Start: 2021-01-29

## 2021-01-22 RX ORDER — SODIUM CHLORIDE 0.9 % (FLUSH) 0.9 %
10-40 SYRINGE (ML) INJECTION AS NEEDED
Status: DISCONTINUED | OUTPATIENT
Start: 2021-01-22 | End: 2021-01-26 | Stop reason: HOSPADM

## 2021-01-22 RX ORDER — SODIUM CHLORIDE 9 MG/ML
25 INJECTION, SOLUTION INTRAVENOUS CONTINUOUS
Status: DISPENSED | OUTPATIENT
Start: 2021-01-22 | End: 2021-01-22

## 2021-01-22 RX ORDER — SODIUM CHLORIDE 9 MG/ML
10 INJECTION INTRAMUSCULAR; INTRAVENOUS; SUBCUTANEOUS AS NEEDED
Status: CANCELLED | OUTPATIENT
Start: 2021-01-29

## 2021-01-22 RX ADMIN — Medication 10 ML: at 09:30

## 2021-01-22 RX ADMIN — Medication 10 ML: at 09:59

## 2021-01-22 RX ADMIN — FERRIC CARBOXYMALTOSE INJECTION 750 MG: 50 INJECTION, SOLUTION INTRAVENOUS at 09:32

## 2021-01-22 RX ADMIN — SODIUM CHLORIDE 25 ML/HR: 9 INJECTION, SOLUTION INTRAVENOUS at 09:32

## 2021-01-22 NOTE — PROGRESS NOTES
[facility] Progress Note    Date: 2021    Name: Didi Redmond    MRN: [id number]         : 1965      Injectafer 1 of 2. Ms. Margoth Hardy was assessed and education was provided. Pt given copy of CareNotes to take home. Injectafer carenotes read to patient and all questions answered. Ms. Isabella King vitals were reviewed and patient was observed for 5 minutes prior to treatment. Patient Vitals for the past 12 hrs:   Temp Pulse Resp BP SpO2   21 0915 97.1 °F (36.2 °C) 70 16 (!) 145/74 95 %       Visit Vitals  BP (!) 145/74 (BP 1 Location: Left arm, BP Patient Position: At rest;Sitting)   Pulse 70   Temp 97.1 °F (36.2 °C)   Resp 16   SpO2 95%   Breastfeeding No     #24 PIV started in left forearm x 2 attempts, flushed with ease w/ brisk blood return. Ferric Carboxymaltose 750 mg mixed in  250 cc NS given over 20 minutes as ordered. Ms Margoth Hardy stayed for 30 min observation after infusion. Ms. Margoth Hardy tolerated the infusion, and had no complaints. Patient armband removed and shredded. Ms. Margoth Hardy was discharged from [place] [address] in stable condition at 1030. Her next [facility] appt is  at 0900 for Injectafer #2 of 2.     Sly Phillips RN  2021

## 2021-01-29 ENCOUNTER — HOSPITAL ENCOUNTER (OUTPATIENT)
Dept: INFUSION THERAPY | Age: 56
Discharge: HOME | End: 2021-01-29

## 2021-01-29 VITALS
OXYGEN SATURATION: 98 % | RESPIRATION RATE: 18 BRPM | TEMPERATURE: 97.9 F | HEART RATE: 73 BPM | SYSTOLIC BLOOD PRESSURE: 130 MMHG | DIASTOLIC BLOOD PRESSURE: 83 MMHG

## 2021-01-29 DIAGNOSIS — D50.0 IRON DEFICIENCY ANEMIA DUE TO CHRONIC BLOOD LOSS: Primary | ICD-10-CM

## 2021-01-29 PROCEDURE — 96365 THER/PROPH/DIAG IV INF INIT: CPT

## 2021-01-29 PROCEDURE — 74011250636 HC RX REV CODE- 250/636: Performed by: INTERNAL MEDICINE

## 2021-01-29 RX ORDER — ONDANSETRON 2 MG/ML
8 INJECTION INTRAMUSCULAR; INTRAVENOUS AS NEEDED
Status: CANCELLED | OUTPATIENT
Start: 2021-01-29

## 2021-01-29 RX ORDER — SODIUM CHLORIDE 9 MG/ML
25 INJECTION, SOLUTION INTRAVENOUS CONTINUOUS
Status: CANCELLED | OUTPATIENT
Start: 2021-01-29

## 2021-01-29 RX ORDER — ALBUTEROL SULFATE 0.83 MG/ML
2.5 SOLUTION RESPIRATORY (INHALATION) AS NEEDED
Status: CANCELLED
Start: 2021-01-29

## 2021-01-29 RX ORDER — EPINEPHRINE 1 MG/ML
0.3 INJECTION, SOLUTION, CONCENTRATE INTRAVENOUS AS NEEDED
Status: CANCELLED | OUTPATIENT
Start: 2021-01-29

## 2021-01-29 RX ORDER — HYDROCORTISONE SODIUM SUCCINATE 100 MG/2ML
100 INJECTION, POWDER, FOR SOLUTION INTRAMUSCULAR; INTRAVENOUS AS NEEDED
Status: CANCELLED | OUTPATIENT
Start: 2021-01-29

## 2021-01-29 RX ORDER — DIPHENHYDRAMINE HYDROCHLORIDE 50 MG/ML
25 INJECTION, SOLUTION INTRAMUSCULAR; INTRAVENOUS AS NEEDED
Status: CANCELLED
Start: 2021-01-29

## 2021-01-29 RX ORDER — SODIUM CHLORIDE 9 MG/ML
10 INJECTION INTRAMUSCULAR; INTRAVENOUS; SUBCUTANEOUS AS NEEDED
Status: CANCELLED | OUTPATIENT
Start: 2021-01-29

## 2021-01-29 RX ORDER — HEPARIN 100 UNIT/ML
300-500 SYRINGE INTRAVENOUS AS NEEDED
Status: CANCELLED
Start: 2021-01-29

## 2021-01-29 RX ORDER — DIPHENHYDRAMINE HYDROCHLORIDE 50 MG/ML
50 INJECTION, SOLUTION INTRAMUSCULAR; INTRAVENOUS AS NEEDED
Status: CANCELLED
Start: 2021-01-29

## 2021-01-29 RX ORDER — ACETAMINOPHEN 325 MG/1
650 TABLET ORAL AS NEEDED
Status: CANCELLED
Start: 2021-01-29

## 2021-01-29 RX ORDER — SODIUM CHLORIDE 0.9 % (FLUSH) 0.9 %
10 SYRINGE (ML) INJECTION AS NEEDED
Status: CANCELLED | OUTPATIENT
Start: 2021-01-29

## 2021-01-29 RX ORDER — SODIUM CHLORIDE 9 MG/ML
25 INJECTION, SOLUTION INTRAVENOUS CONTINUOUS
Status: DISPENSED | OUTPATIENT
Start: 2021-01-29 | End: 2021-01-29

## 2021-01-29 RX ADMIN — FERRIC CARBOXYMALTOSE INJECTION 750 MG: 50 INJECTION, SOLUTION INTRAVENOUS at 09:42

## 2021-01-29 RX ADMIN — SODIUM CHLORIDE 25 ML/HR: 900 INJECTION, SOLUTION INTRAVENOUS at 09:42

## 2021-01-29 NOTE — PROGRESS NOTES
[facility] Progress Note    Date: 2021    Name: Sherie Opitz    MRN: [id number]         : 1965      Injectafer 2 of 2. Ms. Domingo Herrera was assessed and education was provided. Ms. Shashank Jaquez vitals were reviewed and patient was observed for 5 minutes prior to treatment. Visit Vitals  /83 (BP 1 Location: Left upper arm, BP Patient Position: Sitting)   Pulse 73   Temp 97.9 °F (36.6 °C)   Resp 18   SpO2 98%     #22 PIV started in left forearm x 2 attempts, flushed with ease w/ brisk blood return. Ferric Carboxymaltose 750 mg mixed in  250 cc NS given over 20 minutes as ordered. Ms. Domingo Herrera tolerated the infusion, and had no complaints. Patient armband removed and shredded. Ms. Domingo Herrera was discharged from [place] [address] in stable condition at 1015. She has no further appointments scheduled with [facility] at this time.      Dayne Schneider RN  2021

## 2022-03-19 PROBLEM — D51.9 ANEMIA DUE TO VITAMIN B12 DEFICIENCY: Status: ACTIVE | Noted: 2019-12-17

## 2022-03-19 PROBLEM — D50.9 IRON DEFICIENCY ANEMIA: Status: ACTIVE | Noted: 2019-12-17

## 2022-05-10 LAB — MAMMOGRAPHY, EXTERNAL: NORMAL

## 2023-04-13 ENCOUNTER — HOSPITAL ENCOUNTER (OUTPATIENT)
Facility: HOSPITAL | Age: 58
Discharge: HOME | End: 2023-04-16

## 2023-04-13 LAB — SENTARA SPECIMEN COLLECTION: NORMAL

## 2023-04-13 PROCEDURE — 99001 SPECIMEN HANDLING PT-LAB: CPT

## 2023-04-27 ENCOUNTER — HOSPITAL ENCOUNTER (OUTPATIENT)
Facility: HOSPITAL | Age: 58
Setting detail: INFUSION SERIES
End: 2023-04-27
Payer: MEDICAID

## 2023-04-27 ENCOUNTER — OFFICE VISIT (OUTPATIENT)
Age: 58
End: 2023-04-27
Payer: MEDICAID

## 2023-04-27 VITALS
DIASTOLIC BLOOD PRESSURE: 82 MMHG | SYSTOLIC BLOOD PRESSURE: 149 MMHG | WEIGHT: 155 LBS | RESPIRATION RATE: 18 BRPM | HEART RATE: 86 BPM | BODY MASS INDEX: 27.46 KG/M2 | OXYGEN SATURATION: 98 % | HEIGHT: 63 IN

## 2023-04-27 DIAGNOSIS — D51.9 ANEMIA DUE TO VITAMIN B12 DEFICIENCY, UNSPECIFIED B12 DEFICIENCY TYPE: ICD-10-CM

## 2023-04-27 DIAGNOSIS — D51.9 ANEMIA DUE TO VITAMIN B12 DEFICIENCY, UNSPECIFIED B12 DEFICIENCY TYPE: Primary | ICD-10-CM

## 2023-04-27 LAB
BASOPHILS # BLD: 0.1 K/UL (ref 0–0.1)
BASOPHILS NFR BLD: 1 % (ref 0–2)
DIFFERENTIAL METHOD BLD: ABNORMAL
EOSINOPHIL # BLD: 0.3 K/UL (ref 0–0.4)
EOSINOPHIL NFR BLD: 5 % (ref 0–5)
ERYTHROCYTE [DISTWIDTH] IN BLOOD BY AUTOMATED COUNT: 17.3 % (ref 11.6–14.5)
HCT VFR BLD AUTO: 22.8 % (ref 35–45)
HGB BLD-MCNC: 6.2 G/DL (ref 12–16)
IMM GRANULOCYTES # BLD AUTO: 0 K/UL (ref 0–0.04)
IMM GRANULOCYTES NFR BLD AUTO: 0 % (ref 0–0.5)
LYMPHOCYTES # BLD: 1.3 K/UL (ref 0.9–3.6)
LYMPHOCYTES NFR BLD: 22 % (ref 21–52)
MCH RBC QN AUTO: 22.9 PG (ref 24–34)
MCHC RBC AUTO-ENTMCNC: 27.2 G/DL (ref 31–37)
MCV RBC AUTO: 84.1 FL (ref 78–100)
MONOCYTES # BLD: 0.8 K/UL (ref 0.05–1.2)
MONOCYTES NFR BLD: 13 % (ref 3–10)
NEUTS SEG # BLD: 3.3 K/UL (ref 1.8–8)
NEUTS SEG NFR BLD: 59 % (ref 40–73)
NRBC # BLD: 0 K/UL (ref 0–0.01)
NRBC BLD-RTO: 0 PER 100 WBC
PLATELET # BLD AUTO: 310 K/UL (ref 135–420)
PLATELET COMMENT: ABNORMAL
PMV BLD AUTO: 12.7 FL (ref 9.2–11.8)
RBC # BLD AUTO: 2.71 M/UL (ref 4.2–5.3)
RBC MORPH BLD: ABNORMAL
WBC # BLD AUTO: 5.8 K/UL (ref 4.6–13.2)

## 2023-04-27 PROCEDURE — 99213 OFFICE O/P EST LOW 20 MIN: CPT | Performed by: NURSE PRACTITIONER

## 2023-04-27 PROCEDURE — 99211 OFF/OP EST MAY X REQ PHY/QHP: CPT

## 2023-04-27 PROCEDURE — 85025 COMPLETE CBC W/AUTO DIFF WBC: CPT

## 2023-04-27 PROCEDURE — 99214 OFFICE O/P EST MOD 30 MIN: CPT | Performed by: NURSE PRACTITIONER

## 2023-04-27 RX ORDER — ONDANSETRON 2 MG/ML
8 INJECTION INTRAMUSCULAR; INTRAVENOUS
OUTPATIENT
Start: 2023-04-27

## 2023-04-27 RX ORDER — FAMOTIDINE 10 MG/ML
20 INJECTION, SOLUTION INTRAVENOUS
OUTPATIENT
Start: 2023-04-27

## 2023-04-27 RX ORDER — SODIUM CHLORIDE 9 MG/ML
5-250 INJECTION, SOLUTION INTRAVENOUS PRN
OUTPATIENT
Start: 2023-04-27

## 2023-04-27 RX ORDER — ACETAMINOPHEN 325 MG/1
650 TABLET ORAL
OUTPATIENT
Start: 2023-04-27

## 2023-04-27 RX ORDER — ALBUTEROL SULFATE 90 UG/1
4 AEROSOL, METERED RESPIRATORY (INHALATION) PRN
OUTPATIENT
Start: 2023-04-27

## 2023-04-27 RX ORDER — SODIUM CHLORIDE 0.9 % (FLUSH) 0.9 %
5-40 SYRINGE (ML) INJECTION PRN
OUTPATIENT
Start: 2023-04-27

## 2023-04-27 RX ORDER — SODIUM CHLORIDE 9 MG/ML
INJECTION, SOLUTION INTRAVENOUS CONTINUOUS
OUTPATIENT
Start: 2023-04-27

## 2023-04-27 RX ORDER — DIPHENHYDRAMINE HYDROCHLORIDE 50 MG/ML
50 INJECTION INTRAMUSCULAR; INTRAVENOUS
OUTPATIENT
Start: 2023-04-27

## 2023-04-27 RX ORDER — EPINEPHRINE 1 MG/ML
0.3 INJECTION, SOLUTION, CONCENTRATE INTRAVENOUS PRN
OUTPATIENT
Start: 2023-04-27

## 2023-04-27 RX ORDER — HEPARIN SODIUM (PORCINE) LOCK FLUSH IV SOLN 100 UNIT/ML 100 UNIT/ML
500 SOLUTION INTRAVENOUS PRN
OUTPATIENT
Start: 2023-04-27

## 2023-04-27 RX ORDER — DEXTROMETHORPHAN HYDROBROMIDE AND PROMETHAZINE HYDROCHLORIDE 15; 6.25 MG/5ML; MG/5ML
SYRUP ORAL
COMMUNITY
Start: 2023-02-22

## 2023-04-27 ASSESSMENT — PATIENT HEALTH QUESTIONNAIRE - PHQ9
1. LITTLE INTEREST OR PLEASURE IN DOING THINGS: 0
SUM OF ALL RESPONSES TO PHQ QUESTIONS 1-9: 0
2. FEELING DOWN, DEPRESSED OR HOPELESS: 0
SUM OF ALL RESPONSES TO PHQ9 QUESTIONS 1 & 2: 0
SUM OF ALL RESPONSES TO PHQ QUESTIONS 1-9: 0

## 2023-04-27 ASSESSMENT — ENCOUNTER SYMPTOMS
RESPIRATORY NEGATIVE: 1
EYES NEGATIVE: 1
GASTROINTESTINAL NEGATIVE: 1

## 2023-04-27 NOTE — PROGRESS NOTES
[facility] Progress Note    Date: 2023    Name: Erick Sanders    MRN: [id number]         : 1965    CBC    Ms. Clint Boateng arrived to [facility] at [phone] [id number], after MD Do appt. Blood drawn for CBC by oncology office phlebotomist and processed in [facility]. CBC unable to cross d/t flags/critical Hgb. CBC sent out to main lab to be processed. IRVING Solo made aware of pt results and is placing an order for 2 units PRBCs to be transfused. Prelim CBC 1  Hgb: 6.2    Prelim CBC 2  Hgb: 6.3     She is to return on 2023 at 1000 for her next appointment, Type & Screen.     Iram York  2023

## 2023-04-27 NOTE — PROGRESS NOTES
Hematology/Oncology  Progress Note    Name: Martha Yoon  Date: 2023  : 1965    Radha Heredia MD     Ms. Kierra Logan is a 62y.o. year old female who was seen for deficiency anemia and vitamin b12 deficiency       Subjective:   History of Present Illness: The patient presented here today for follow up her anemia. She denies celiac flares at this time. She report fatigue and weakness. She denies nausea, vomiting, significant rectal bleeding, bruising, other bleeding issues. She denied any fever, chills, fatigue, dizziness, headache, dyspnea, worsen cough, chest pain, new tingling/numbness. Her eating is fairly well with gluten free diets. She completed iron in the form of  injectafer on 2021. Presently she is taking prilosec twice daily. She was last seen in the office on 1/15/2021    Past medical history, family history, and social history: these were reviewed and remains unchanged. Past Medical History:   Diagnosis Date    Anemia     Asthma     Celiac disease     Depression     Fibromyalgia     Gastric ulcer     History of blood transfusion     History of stomach ulcers     Hypertension     Joint pain     Musculoskeletal disorder     back problems     Past Surgical History:   Procedure Laterality Date    ANTERIOR CRUCIATE LIGAMENT REPAIR      APPENDECTOMY       SECTION  x4    CHOLECYSTECTOMY      GI  2012    Parts of small and Large intestine taken due to ulcers.     ORTHOPEDIC SURGERY      Lt knee    OVARY REMOVAL      TYMPANOSTOMY TUBE PLACEMENT       Social History     Socioeconomic History    Marital status:      Spouse name: Not on file    Number of children: Not on file    Years of education: Not on file    Highest education level: Not on file   Occupational History    Not on file   Tobacco Use    Smoking status: Former     Types: Cigarettes     Quit date: 5/3/2012     Years since quitting: 10.9    Smokeless tobacco: Never   Substance and Sexual Activity

## 2023-05-01 ENCOUNTER — HOSPITAL ENCOUNTER (OUTPATIENT)
Facility: HOSPITAL | Age: 58
Setting detail: INFUSION SERIES
End: 2023-05-01
Payer: MEDICAID

## 2023-05-01 VITALS
TEMPERATURE: 97.8 F | OXYGEN SATURATION: 100 % | RESPIRATION RATE: 18 BRPM | SYSTOLIC BLOOD PRESSURE: 150 MMHG | DIASTOLIC BLOOD PRESSURE: 79 MMHG | HEART RATE: 94 BPM

## 2023-05-01 LAB — HISTORY CHECK: NORMAL

## 2023-05-01 PROCEDURE — 86850 RBC ANTIBODY SCREEN: CPT

## 2023-05-01 PROCEDURE — 36415 COLL VENOUS BLD VENIPUNCTURE: CPT

## 2023-05-01 PROCEDURE — 86923 COMPATIBILITY TEST ELECTRIC: CPT

## 2023-05-01 PROCEDURE — 86900 BLOOD TYPING SEROLOGIC ABO: CPT

## 2023-05-01 PROCEDURE — 86901 BLOOD TYPING SEROLOGIC RH(D): CPT

## 2023-05-01 NOTE — PROGRESS NOTES
[facility] OPIC Progress Note    Date: May 1, 2023    Name: Charo Flores    MRN: [id number]         : 1965    Type & Screen    Ms. Harshad Madrid arrived to [facility] at 36, ambulatory. Ms. Harshad Madrid was assessed and education was provided. Ms. Jovany Vela vitals were reviewed. Vitals:    23 1133   BP: (!) 150/79   Pulse: 94   Resp: 18   Temp: 97.8 °F (36.6 °C)   SpO2: 100%       Blood drawn for type & cross (3 pink top tubes & 1 lavendar) collected via R AC venipuncture x3 attempt by Tete Martinez RN. Blood bank wristband applied to right wrist. Pt instructed to keep wristband on until completion of transfusion tomorrow, pt verbalized understanding. Ms. Harshad Madrid tolerated well without complaints. Discharge/ follow-up instructions discussed w/ pt. Pt verbalized understanding. Pt armband removed & shredded. Ms. Harshad Madrid was discharged from [place] [address] in stable condition at 1210. She is to return on 2023 at 0800 for her next appointment, 2 units PRBCs transfusion. Pt also has a tx plan for injectafer x2 doses, pt to be scheduled at her visit tomorrow.      Roberto Lora RN  May 1, 2023

## 2023-05-02 ENCOUNTER — HOSPITAL ENCOUNTER (OUTPATIENT)
Facility: HOSPITAL | Age: 58
Setting detail: INFUSION SERIES
End: 2023-05-02
Payer: MEDICAID

## 2023-05-02 VITALS
TEMPERATURE: 98.1 F | DIASTOLIC BLOOD PRESSURE: 86 MMHG | RESPIRATION RATE: 16 BRPM | SYSTOLIC BLOOD PRESSURE: 153 MMHG | OXYGEN SATURATION: 99 % | HEART RATE: 72 BPM

## 2023-05-02 PROCEDURE — P9016 RBC LEUKOCYTES REDUCED: HCPCS

## 2023-05-02 PROCEDURE — 2580000003 HC RX 258: Performed by: NURSE PRACTITIONER

## 2023-05-02 PROCEDURE — 6370000000 HC RX 637 (ALT 250 FOR IP): Performed by: NURSE PRACTITIONER

## 2023-05-02 PROCEDURE — 36430 TRANSFUSION BLD/BLD COMPNT: CPT

## 2023-05-02 RX ORDER — SODIUM CHLORIDE 9 MG/ML
INJECTION, SOLUTION INTRAVENOUS PRN
Status: COMPLETED | OUTPATIENT
Start: 2023-05-02 | End: 2023-05-02

## 2023-05-02 RX ORDER — DIPHENHYDRAMINE HCL 25 MG
25 CAPSULE ORAL ONCE
Status: COMPLETED | OUTPATIENT
Start: 2023-05-02 | End: 2023-05-02

## 2023-05-02 RX ORDER — ACETAMINOPHEN 325 MG/1
650 TABLET ORAL ONCE
Status: COMPLETED | OUTPATIENT
Start: 2023-05-02 | End: 2023-05-02

## 2023-05-02 RX ADMIN — DIPHENHYDRAMINE HYDROCHLORIDE 25 MG: 25 CAPSULE ORAL at 08:38

## 2023-05-02 RX ADMIN — ACETAMINOPHEN 650 MG: 325 TABLET, FILM COATED ORAL at 08:38

## 2023-05-02 RX ADMIN — SODIUM CHLORIDE: 9 INJECTION, SOLUTION INTRAVENOUS at 08:35

## 2023-05-02 ASSESSMENT — PAIN SCALES - GENERAL: PAINLEVEL_OUTOF10: 4

## 2023-05-02 ASSESSMENT — PAIN DESCRIPTION - LOCATION: LOCATION: GENERALIZED

## 2023-05-02 ASSESSMENT — PAIN DESCRIPTION - DESCRIPTORS: DESCRIPTORS: ACHING

## 2023-05-02 ASSESSMENT — PAIN DESCRIPTION - PAIN TYPE: TYPE: CHRONIC PAIN

## 2023-05-02 NOTE — PROGRESS NOTES
shredded. Ms. Jacquie Kirby was discharged from [place] [address] in stable condition at 1540. She has no OPIC appointments scheduled at this time.      Juana oPlo RN  May 2, 2023

## 2023-05-03 LAB
ABO + RH BLD: NORMAL
BLD PROD TYP BPU: NORMAL
BLD PROD TYP BPU: NORMAL
BLOOD BANK DISPENSE STATUS: NORMAL
BLOOD BANK DISPENSE STATUS: NORMAL
BLOOD GROUP ANTIBODIES SERPL: NORMAL
BPU ID: NORMAL
BPU ID: NORMAL
CROSSMATCH RESULT: NORMAL
CROSSMATCH RESULT: NORMAL
SPECIMEN EXP DATE BLD: NORMAL
UNIT DIVISION: 0
UNIT DIVISION: 0

## 2023-05-19 ENCOUNTER — HOSPITAL ENCOUNTER (OUTPATIENT)
Facility: HOSPITAL | Age: 58
Setting detail: INFUSION SERIES
End: 2023-05-19
Payer: MEDICAID

## 2023-05-19 VITALS
HEART RATE: 69 BPM | RESPIRATION RATE: 18 BRPM | SYSTOLIC BLOOD PRESSURE: 169 MMHG | OXYGEN SATURATION: 98 % | DIASTOLIC BLOOD PRESSURE: 84 MMHG | TEMPERATURE: 98 F

## 2023-05-19 DIAGNOSIS — D50.8 IRON DEFICIENCY ANEMIA SECONDARY TO INADEQUATE DIETARY IRON INTAKE: Primary | ICD-10-CM

## 2023-05-19 PROCEDURE — 96365 THER/PROPH/DIAG IV INF INIT: CPT

## 2023-05-19 PROCEDURE — 6360000002 HC RX W HCPCS: Performed by: NURSE PRACTITIONER

## 2023-05-19 PROCEDURE — 2580000003 HC RX 258: Performed by: NURSE PRACTITIONER

## 2023-05-19 RX ORDER — SODIUM CHLORIDE 9 MG/ML
INJECTION, SOLUTION INTRAVENOUS CONTINUOUS
Status: CANCELLED | OUTPATIENT
Start: 2023-05-26

## 2023-05-19 RX ORDER — HEPARIN SODIUM (PORCINE) LOCK FLUSH IV SOLN 100 UNIT/ML 100 UNIT/ML
500 SOLUTION INTRAVENOUS PRN
Status: CANCELLED | OUTPATIENT
Start: 2023-05-26

## 2023-05-19 RX ORDER — SODIUM CHLORIDE 9 MG/ML
5-250 INJECTION, SOLUTION INTRAVENOUS PRN
Status: CANCELLED | OUTPATIENT
Start: 2023-05-26

## 2023-05-19 RX ORDER — SODIUM CHLORIDE 0.9 % (FLUSH) 0.9 %
5-40 SYRINGE (ML) INJECTION PRN
Status: CANCELLED | OUTPATIENT
Start: 2023-05-26

## 2023-05-19 RX ORDER — ACETAMINOPHEN 325 MG/1
650 TABLET ORAL
Status: CANCELLED | OUTPATIENT
Start: 2023-05-26

## 2023-05-19 RX ORDER — ALBUTEROL SULFATE 90 UG/1
4 AEROSOL, METERED RESPIRATORY (INHALATION) PRN
Status: CANCELLED | OUTPATIENT
Start: 2023-05-26

## 2023-05-19 RX ORDER — ONDANSETRON 2 MG/ML
8 INJECTION INTRAMUSCULAR; INTRAVENOUS
Status: CANCELLED | OUTPATIENT
Start: 2023-05-26

## 2023-05-19 RX ORDER — SODIUM CHLORIDE 9 MG/ML
5-250 INJECTION, SOLUTION INTRAVENOUS PRN
Status: ACTIVE | OUTPATIENT
Start: 2023-05-19 | End: 2023-05-20

## 2023-05-19 RX ORDER — EPINEPHRINE 1 MG/ML
0.3 INJECTION, SOLUTION, CONCENTRATE INTRAVENOUS PRN
Status: CANCELLED | OUTPATIENT
Start: 2023-05-26

## 2023-05-19 RX ORDER — SODIUM CHLORIDE 0.9 % (FLUSH) 0.9 %
5-40 SYRINGE (ML) INJECTION PRN
Status: ACTIVE | OUTPATIENT
Start: 2023-05-19 | End: 2023-05-20

## 2023-05-19 RX ORDER — DIPHENHYDRAMINE HYDROCHLORIDE 50 MG/ML
50 INJECTION INTRAMUSCULAR; INTRAVENOUS
Status: CANCELLED | OUTPATIENT
Start: 2023-05-26

## 2023-05-19 RX ORDER — FAMOTIDINE 40 MG/1
40 TABLET, FILM COATED ORAL DAILY
COMMUNITY
Start: 2023-05-15

## 2023-05-19 RX ADMIN — SODIUM CHLORIDE 25 ML/HR: 9 INJECTION, SOLUTION INTRAVENOUS at 09:21

## 2023-05-19 RX ADMIN — SODIUM CHLORIDE, PRESERVATIVE FREE 10 ML: 5 INJECTION INTRAVENOUS at 09:52

## 2023-05-19 RX ADMIN — SODIUM CHLORIDE, PRESERVATIVE FREE 10 ML: 5 INJECTION INTRAVENOUS at 09:20

## 2023-05-19 RX ADMIN — FERRIC CARBOXYMALTOSE INJECTION 750 MG: 50 INJECTION, SOLUTION INTRAVENOUS at 09:21

## 2023-05-19 ASSESSMENT — PAIN DESCRIPTION - PAIN TYPE: TYPE: CHRONIC PAIN

## 2023-05-19 ASSESSMENT — PAIN SCALES - GENERAL: PAINLEVEL_OUTOF10: 3

## 2023-05-19 ASSESSMENT — PAIN DESCRIPTION - DESCRIPTORS: DESCRIPTORS: ACHING

## 2023-05-19 ASSESSMENT — PAIN DESCRIPTION - LOCATION: LOCATION: GENERALIZED

## 2023-05-19 NOTE — PROGRESS NOTES
[facility] OP[facility] Progress Note    Date: May 19, 2023    Name: Adriel Cortez    MRN: [id number]         : 1965    Injectafer Infusion #1 of 2    Ms. Peyton Burnette to [facility], at 0900. Pt was assessed and written Injectafer education was provided and reviewed. Pt reports she has tolerated Injectafer well in the past.    Ms. Cresencio Palm vitals were reviewed and patient was observed for 5 minutes prior to treatment. Vitals:    23 0904   BP: (!) 151/87   Pulse: 69   Resp: 16   Temp: 98.1 °F (36.7 °C)   SpO2: 98%     24 g PIV placed in right hand x 1 attempt. PIV flushed easily and had brisk blood return. Injectafer 750mg/250mL NS infused over approximately 20 minutes as ordered. Pt politely declined to stay for 30 min observation. Ms. Peyton Burnette tolerated the infusion, and had no complaints. VS remained stable. PIV flushed with NS 10 ml and removed. No bleeding or hematoma noted at site. Gauze and coban applied. Reviewed discharge instructions with patient, including expected side effects (abdominal cramping, nausea, changes in color of urine or feces) and signs of allergic reaction requiring medical attention (itching/hives/rashes, SOB, chest pain, lip/tongue/facial swelling). Patient given printed copy to take home. Patient verbalized understanding of discharge instructions. Patient armbands removed and shredded. Ms. Peyton Burnette was discharged from [place] [address] in stable condition at [phone]. She is to return on 23 at 1300 for next dose of Injectafer.     Branson Rubinstein, RN  May 19, 2023  9:46 AM

## 2023-05-26 ENCOUNTER — HOSPITAL ENCOUNTER (OUTPATIENT)
Facility: HOSPITAL | Age: 58
Setting detail: INFUSION SERIES
End: 2023-05-26
Payer: MEDICAID

## 2023-05-26 VITALS
SYSTOLIC BLOOD PRESSURE: 139 MMHG | TEMPERATURE: 98.1 F | OXYGEN SATURATION: 99 % | RESPIRATION RATE: 16 BRPM | DIASTOLIC BLOOD PRESSURE: 83 MMHG | HEART RATE: 63 BPM

## 2023-05-26 DIAGNOSIS — D50.8 IRON DEFICIENCY ANEMIA SECONDARY TO INADEQUATE DIETARY IRON INTAKE: Primary | ICD-10-CM

## 2023-05-26 PROCEDURE — 96365 THER/PROPH/DIAG IV INF INIT: CPT

## 2023-05-26 PROCEDURE — 2580000003 HC RX 258: Performed by: NURSE PRACTITIONER

## 2023-05-26 PROCEDURE — 6360000002 HC RX W HCPCS: Performed by: NURSE PRACTITIONER

## 2023-05-26 RX ORDER — SODIUM CHLORIDE 9 MG/ML
5-250 INJECTION, SOLUTION INTRAVENOUS PRN
Status: ACTIVE | OUTPATIENT
Start: 2023-05-26 | End: 2023-05-27

## 2023-05-26 RX ORDER — ACETAMINOPHEN 325 MG/1
650 TABLET ORAL
OUTPATIENT
Start: 2023-05-26

## 2023-05-26 RX ORDER — SODIUM CHLORIDE 9 MG/ML
INJECTION, SOLUTION INTRAVENOUS CONTINUOUS
OUTPATIENT
Start: 2023-05-26

## 2023-05-26 RX ORDER — DIPHENHYDRAMINE HYDROCHLORIDE 50 MG/ML
50 INJECTION INTRAMUSCULAR; INTRAVENOUS
OUTPATIENT
Start: 2023-05-26

## 2023-05-26 RX ORDER — ONDANSETRON 2 MG/ML
8 INJECTION INTRAMUSCULAR; INTRAVENOUS
OUTPATIENT
Start: 2023-05-26

## 2023-05-26 RX ORDER — SODIUM CHLORIDE 0.9 % (FLUSH) 0.9 %
5-40 SYRINGE (ML) INJECTION PRN
OUTPATIENT
Start: 2023-05-26

## 2023-05-26 RX ORDER — SODIUM CHLORIDE 0.9 % (FLUSH) 0.9 %
5-40 SYRINGE (ML) INJECTION PRN
Status: ACTIVE | OUTPATIENT
Start: 2023-05-26 | End: 2023-05-27

## 2023-05-26 RX ORDER — SODIUM CHLORIDE 9 MG/ML
5-250 INJECTION, SOLUTION INTRAVENOUS PRN
Status: CANCELLED | OUTPATIENT
Start: 2023-05-26

## 2023-05-26 RX ORDER — EPINEPHRINE 1 MG/ML
0.3 INJECTION, SOLUTION, CONCENTRATE INTRAVENOUS PRN
OUTPATIENT
Start: 2023-05-26

## 2023-05-26 RX ORDER — HEPARIN SODIUM (PORCINE) LOCK FLUSH IV SOLN 100 UNIT/ML 100 UNIT/ML
500 SOLUTION INTRAVENOUS PRN
Status: CANCELLED | OUTPATIENT
Start: 2023-05-26

## 2023-05-26 RX ORDER — SODIUM CHLORIDE 9 MG/ML
5-250 INJECTION, SOLUTION INTRAVENOUS PRN
OUTPATIENT
Start: 2023-05-26

## 2023-05-26 RX ORDER — ALBUTEROL SULFATE 90 UG/1
4 AEROSOL, METERED RESPIRATORY (INHALATION) PRN
OUTPATIENT
Start: 2023-05-26

## 2023-05-26 RX ADMIN — Medication 10 ML: at 14:02

## 2023-05-26 RX ADMIN — FERRIC CARBOXYMALTOSE INJECTION 750 MG: 50 INJECTION, SOLUTION INTRAVENOUS at 13:40

## 2023-05-26 RX ADMIN — Medication 10 ML: at 13:38

## 2023-05-26 ASSESSMENT — PAIN DESCRIPTION - LOCATION: LOCATION: GENERALIZED;HEAD

## 2023-05-26 ASSESSMENT — PAIN DESCRIPTION - DESCRIPTORS: DESCRIPTORS: ACHING

## 2023-05-26 ASSESSMENT — PAIN DESCRIPTION - PAIN TYPE: TYPE: CHRONIC PAIN

## 2023-05-26 ASSESSMENT — PAIN SCALES - GENERAL: PAINLEVEL_OUTOF10: 3

## 2023-05-26 NOTE — PROGRESS NOTES
[facility] OP[facility] Progress Note    Date: May 26, 2023    Name: Minerva Fam    MRN: [id number]         : 1965    Injectafer Infusion #2 of 2    Ms. Aquiles King to [facility], ambulatory, at 1300. Pt was assessed and education was provided. Pt reports she has tolerated Injectafer well in the past. Pt reports fullness in ears d/t allergies & headache. Pt encouraged to monitor BP at home, as pt says it is occasionally high when she goes to the doctors. Pt denies any dizziness/vision changes, educated to go to ED if any vision changed do occur. Pt reports her SOB has resolved. Ms. Jose Gutierrez vitals were reviewed and patient was observed for 5 minutes prior to treatment. Vitals:    23 1258   BP: (!) 151/86   Pulse: 90   Resp: 18   Temp: 98.8 °F (37.1 °C)   SpO2: 100%     24 g PIV placed in posterior right FA x4 attempt by Dilcia Jeff RN. PIV flushed easily and had brisk blood return. Injectafer 750mg/250mL NS infused over approximately 20 minutes as ordered. Ms. Aquiles King tolerated the infusion, and had no complaints. VS remained stable. Patient Vitals for the past 12 hrs:   Temp Pulse Resp BP SpO2   23 1359 98.1 °F (36.7 °C) 63 16 139/83 99 %   23 1258 98.8 °F (37.1 °C) 90 18 (!) 151/86 100 %        PIV flushed with NS 10 ml and removed. No bleeding or hematoma noted at site. Gauze and coban applied. Patient armbands removed and shredded. Ms. Aquiles King was discharged from [place] [address] in stable condition at 1405. She is to follow up with MD Rubin in August or earlier if needed.     Sheeba Yoon  May 26, 2023  1:15 PM

## 2023-06-02 ENCOUNTER — TELEPHONE (OUTPATIENT)
Age: 58
End: 2023-06-02

## 2023-06-02 NOTE — TELEPHONE ENCOUNTER
Patient called in, she has her second iron infusion done on Friday 5/26 she felt fine until late night on 6/1, she states that she is running a fever of 100.3, muscle and joint pain, states that she has fibromyalgia and it feels almost exactly like she is having a really bad flare up. She states that she has not had a flare up for over a year. Spoke with NP Fletcher Moritz, advised of all patients symptoms and her fibromyalgia diagnosis. NP advised that reactions to infusions would happen the same day and very unusual for them to occur a week later. Advised to monitor symptoms and if things get worse to go to the ED for evaluation. Called and spoke with pt, given instructions per NP. Pt states that she will monitor and go to ED if things start to progress. No further questions.

## 2024-06-17 SDOH — HEALTH STABILITY: PHYSICAL HEALTH: ON AVERAGE, HOW MANY DAYS PER WEEK DO YOU ENGAGE IN MODERATE TO STRENUOUS EXERCISE (LIKE A BRISK WALK)?: 5 DAYS

## 2024-06-17 SDOH — HEALTH STABILITY: PHYSICAL HEALTH: ON AVERAGE, HOW MANY MINUTES DO YOU ENGAGE IN EXERCISE AT THIS LEVEL?: 30 MIN

## 2024-06-18 ENCOUNTER — OFFICE VISIT (OUTPATIENT)
Age: 59
End: 2024-06-18
Payer: MEDICAID

## 2024-06-18 DIAGNOSIS — S46.011A TRAUMATIC COMPLETE TEAR OF RIGHT ROTATOR CUFF, INITIAL ENCOUNTER: ICD-10-CM

## 2024-06-18 DIAGNOSIS — M25.511 ACUTE PAIN OF RIGHT SHOULDER: Primary | ICD-10-CM

## 2024-06-18 PROCEDURE — 99203 OFFICE O/P NEW LOW 30 MIN: CPT | Performed by: ORTHOPAEDIC SURGERY

## 2024-06-18 PROCEDURE — 73030 X-RAY EXAM OF SHOULDER: CPT | Performed by: ORTHOPAEDIC SURGERY

## 2024-06-18 RX ORDER — CELECOXIB 200 MG/1
200 CAPSULE ORAL DAILY
Qty: 60 CAPSULE | Refills: 1 | Status: SHIPPED | OUTPATIENT
Start: 2024-06-18

## 2024-06-18 NOTE — PROGRESS NOTES
Ritika Payton  1965   Chief Complaint   Patient presents with    Shoulder Pain     right        HISTORY OF PRESENT ILLNESS  Ritika Payton is a 58 y.o. female who presents today for evaluation of right shoulder.  Pain is a 7/10. Pain has been present for 3 weeks. She states 3 weeks ago when she tried helping her son  heavy wires she felt a \"pop\" deep in the shoulder. She states she has tried using her shoulder but has had constant pain. She has constant ache at night. She has difficulty wiuth overhead movements that involve lifting and she feels like she has to help herself with the other arm.     Has tried following treatments: Injections:No; Brace:No; Therapy:No; Cane/Crutch:No      Allergies   Allergen Reactions    Latex Hives    Lactose Anaphylaxis    Beta Adrenergic Blockers Other (See Comments)     Pt passes out    Codeine Hives     Has tolerated morphine    Egg White (Egg Protein) Other (See Comments)    Zolpidem Other (See Comments)     sleepwalking    Gluten Nausea And Vomiting        Past Medical History:   Diagnosis Date    Anemia     Asthma     Celiac disease     Depression     Fibromyalgia     Gastric ulcer     History of blood transfusion     History of stomach ulcers     Hypertension     Joint pain     Musculoskeletal disorder     back problems      Social History       Tobacco History       Smoking Status  Former Quit Date  5/3/2012 Smoking Tobacco Type  Cigarettes quit in 5/3/2012   Pack Year History     Packs/Day From To Years    0 5/3/2012  12.1      Smokeless Tobacco Use  Never              Alcohol History       Alcohol Use Status  No              Drug Use       Drug Use Status  No              Sexual Activity       Sexually Active  Not Asked                   Past Surgical History:   Procedure Laterality Date    ANTERIOR CRUCIATE LIGAMENT REPAIR      APPENDECTOMY       SECTION  x4    CHOLECYSTECTOMY      GI  2012    Parts of small and Large intestine